# Patient Record
Sex: FEMALE | Race: WHITE | NOT HISPANIC OR LATINO | URBAN - METROPOLITAN AREA
[De-identification: names, ages, dates, MRNs, and addresses within clinical notes are randomized per-mention and may not be internally consistent; named-entity substitution may affect disease eponyms.]

---

## 2021-07-12 ENCOUNTER — OUTPATIENT (OUTPATIENT)
Dept: OUTPATIENT SERVICES | Facility: HOSPITAL | Age: 36
LOS: 1 days | Discharge: HOME | End: 2021-07-12
Payer: OTHER GOVERNMENT

## 2021-07-12 DIAGNOSIS — R10.9 UNSPECIFIED ABDOMINAL PAIN: ICD-10-CM

## 2021-07-12 PROCEDURE — 76775 US EXAM ABDO BACK WALL LIM: CPT | Mod: 26

## 2021-07-21 ENCOUNTER — INPATIENT (INPATIENT)
Facility: HOSPITAL | Age: 36
LOS: 1 days | Discharge: HOME | End: 2021-07-23
Attending: STUDENT IN AN ORGANIZED HEALTH CARE EDUCATION/TRAINING PROGRAM | Admitting: STUDENT IN AN ORGANIZED HEALTH CARE EDUCATION/TRAINING PROGRAM
Payer: OTHER GOVERNMENT

## 2021-07-21 VITALS
HEART RATE: 91 BPM | DIASTOLIC BLOOD PRESSURE: 83 MMHG | SYSTOLIC BLOOD PRESSURE: 195 MMHG | WEIGHT: 156.97 LBS | OXYGEN SATURATION: 99 % | RESPIRATION RATE: 18 BRPM | TEMPERATURE: 98 F

## 2021-07-21 LAB
ALBUMIN SERPL ELPH-MCNC: 4.1 G/DL — SIGNIFICANT CHANGE UP (ref 3.5–5.2)
ALP SERPL-CCNC: 72 U/L — SIGNIFICANT CHANGE UP (ref 30–115)
ALT FLD-CCNC: 11 U/L — SIGNIFICANT CHANGE UP (ref 0–41)
ANION GAP SERPL CALC-SCNC: 14 MMOL/L — SIGNIFICANT CHANGE UP (ref 7–14)
APTT BLD: 30.9 SEC — SIGNIFICANT CHANGE UP (ref 27–39.2)
AST SERPL-CCNC: 12 U/L — SIGNIFICANT CHANGE UP (ref 0–41)
BASOPHILS # BLD AUTO: 0.08 K/UL — SIGNIFICANT CHANGE UP (ref 0–0.2)
BASOPHILS NFR BLD AUTO: 0.6 % — SIGNIFICANT CHANGE UP (ref 0–1)
BILIRUB SERPL-MCNC: <0.2 MG/DL — SIGNIFICANT CHANGE UP (ref 0.2–1.2)
BUN SERPL-MCNC: 17 MG/DL — SIGNIFICANT CHANGE UP (ref 10–20)
CALCIUM SERPL-MCNC: 9 MG/DL — SIGNIFICANT CHANGE UP (ref 8.5–10.1)
CHLORIDE SERPL-SCNC: 103 MMOL/L — SIGNIFICANT CHANGE UP (ref 98–110)
CO2 SERPL-SCNC: 20 MMOL/L — SIGNIFICANT CHANGE UP (ref 17–32)
CREAT SERPL-MCNC: 0.7 MG/DL — SIGNIFICANT CHANGE UP (ref 0.7–1.5)
EOSINOPHIL # BLD AUTO: 0.07 K/UL — SIGNIFICANT CHANGE UP (ref 0–0.7)
EOSINOPHIL NFR BLD AUTO: 0.5 % — SIGNIFICANT CHANGE UP (ref 0–8)
GLUCOSE SERPL-MCNC: 107 MG/DL — HIGH (ref 70–99)
HCG SERPL QL: NEGATIVE — SIGNIFICANT CHANGE UP
HCT VFR BLD CALC: 42.5 % — SIGNIFICANT CHANGE UP (ref 37–47)
HGB BLD-MCNC: 14.1 G/DL — SIGNIFICANT CHANGE UP (ref 12–16)
IMM GRANULOCYTES NFR BLD AUTO: 0.5 % — HIGH (ref 0.1–0.3)
INR BLD: 1.01 RATIO — SIGNIFICANT CHANGE UP (ref 0.65–1.3)
LYMPHOCYTES # BLD AUTO: 2.8 K/UL — SIGNIFICANT CHANGE UP (ref 1.2–3.4)
LYMPHOCYTES # BLD AUTO: 20 % — LOW (ref 20.5–51.1)
MCHC RBC-ENTMCNC: 28.6 PG — SIGNIFICANT CHANGE UP (ref 27–31)
MCHC RBC-ENTMCNC: 33.2 G/DL — SIGNIFICANT CHANGE UP (ref 32–37)
MCV RBC AUTO: 86.2 FL — SIGNIFICANT CHANGE UP (ref 81–99)
MONOCYTES # BLD AUTO: 0.54 K/UL — SIGNIFICANT CHANGE UP (ref 0.1–0.6)
MONOCYTES NFR BLD AUTO: 3.9 % — SIGNIFICANT CHANGE UP (ref 1.7–9.3)
NEUTROPHILS # BLD AUTO: 10.44 K/UL — HIGH (ref 1.4–6.5)
NEUTROPHILS NFR BLD AUTO: 74.5 % — SIGNIFICANT CHANGE UP (ref 42.2–75.2)
NRBC # BLD: 0 /100 WBCS — SIGNIFICANT CHANGE UP (ref 0–0)
PLATELET # BLD AUTO: 419 K/UL — HIGH (ref 130–400)
POTASSIUM SERPL-MCNC: 3.7 MMOL/L — SIGNIFICANT CHANGE UP (ref 3.5–5)
POTASSIUM SERPL-SCNC: 3.7 MMOL/L — SIGNIFICANT CHANGE UP (ref 3.5–5)
PROT SERPL-MCNC: 6.8 G/DL — SIGNIFICANT CHANGE UP (ref 6–8)
PROTHROM AB SERPL-ACNC: 11.6 SEC — SIGNIFICANT CHANGE UP (ref 9.95–12.87)
RBC # BLD: 4.93 M/UL — SIGNIFICANT CHANGE UP (ref 4.2–5.4)
RBC # FLD: 13.1 % — SIGNIFICANT CHANGE UP (ref 11.5–14.5)
SARS-COV-2 RNA SPEC QL NAA+PROBE: SIGNIFICANT CHANGE UP
SODIUM SERPL-SCNC: 137 MMOL/L — SIGNIFICANT CHANGE UP (ref 135–146)
TROPONIN T SERPL-MCNC: <0.01 NG/ML — SIGNIFICANT CHANGE UP
WBC # BLD: 14 K/UL — HIGH (ref 4.8–10.8)
WBC # FLD AUTO: 14 K/UL — HIGH (ref 4.8–10.8)

## 2021-07-21 PROCEDURE — 93010 ELECTROCARDIOGRAM REPORT: CPT

## 2021-07-21 PROCEDURE — 70498 CT ANGIOGRAPHY NECK: CPT | Mod: 26,MA

## 2021-07-21 PROCEDURE — 70496 CT ANGIOGRAPHY HEAD: CPT | Mod: 26,MA

## 2021-07-21 PROCEDURE — 70551 MRI BRAIN STEM W/O DYE: CPT | Mod: 26,MA

## 2021-07-21 PROCEDURE — 70450 CT HEAD/BRAIN W/O DYE: CPT | Mod: 26,MA,59

## 2021-07-21 PROCEDURE — 71045 X-RAY EXAM CHEST 1 VIEW: CPT | Mod: 26

## 2021-07-21 PROCEDURE — 99218: CPT

## 2021-07-21 PROCEDURE — 99283 EMERGENCY DEPT VISIT LOW MDM: CPT

## 2021-07-21 PROCEDURE — 0042T: CPT

## 2021-07-21 RX ORDER — ASPIRIN/CALCIUM CARB/MAGNESIUM 324 MG
325 TABLET ORAL ONCE
Refills: 0 | Status: COMPLETED | OUTPATIENT
Start: 2021-07-21 | End: 2021-07-21

## 2021-07-21 RX ORDER — ATORVASTATIN CALCIUM 80 MG/1
80 TABLET, FILM COATED ORAL ONCE
Refills: 0 | Status: COMPLETED | OUTPATIENT
Start: 2021-07-21 | End: 2021-07-21

## 2021-07-21 RX ADMIN — ATORVASTATIN CALCIUM 80 MILLIGRAM(S): 80 TABLET, FILM COATED ORAL at 16:16

## 2021-07-21 RX ADMIN — Medication 325 MILLIGRAM(S): at 11:31

## 2021-07-21 NOTE — ED CDU PROVIDER INITIAL DAY NOTE - ATTENDING CONTRIBUTION TO CARE
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 She reports that she was sitting talking on the phone when she felt like the room was spinning, similar to prior episodes of BPPV. She went to sit on the cough, and then felt like there was a lump on the right side her throat with swallowing difficulty. On arrival NIH 1 d/t decreased sensation to R lower face. Not a tpa candidate due to low NIH score. HCT, CTA head/neck, and CTP wnl, no lvo or ischemia. Patient reports all symptoms have resolved, lasting 1 hour total.     Gen - NAD, Head - NCAT, Pharynx - clear, MMM, Heart - RRR, no m/g/r, Lungs - CTAB, no w/c/r, Abdomen - soft, NT, ND, Skin - No rash, Extremities - FROM, no edema, erythema, ecchymosis, brisk cap refill, Neuro - CN 2-12 intact, normal finger to nose, normal romberg, stable gait, no sensory or motor deficits, Alert, oriented, grossly unremarkable. No Focal deficits. GCS 15. NIH 0    Pt placed in obs for MRI brain

## 2021-07-21 NOTE — ED ADULT TRIAGE NOTE - CHIEF COMPLAINT QUOTE
Pt complaining of sudden double vision and R sided facial numbness. Pt complaining of trouble swallowing

## 2021-07-21 NOTE — ED CDU PROVIDER INITIAL DAY NOTE - MEDICAL DECISION MAKING DETAILS
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 She reports that she was sitting talking on the phone when she felt like the room was spinning, similar to prior episodes of BPPV. She went to sit on the cough, and then felt like there was a lump on the right side her throat with swallowing difficulty. On arrival NIH 1 d/t decreased sensation to R lower face. Not a tpa candidate due to low NIH score. HCT, CTA head/neck, and CTP wnl, no lvo or ischemia. Patient reports all symptoms have resolved, lasting 1 hour total. Pt placed in obs for MRI brain, full dose aspirin, high intensity statin, lipid panel, TTE.

## 2021-07-21 NOTE — ED CDU PROVIDER INITIAL DAY NOTE - OBJECTIVE STATEMENT
35  year old F with no pmhx c/o episode of dizziness (acute room spinning sensation) with assoc pins/needle sensation to tongue/mouth/ rt lower face, episode of double vision and difficulty swallowing. Symptoms lasted about 1 hr and have since resolved. No assoc chest pain, sob, headache, nausea, vomiting, weakness, loss of vision, lower extrem weakness, hx of cva. + pt smokes 1ppd.

## 2021-07-21 NOTE — ED PROVIDER NOTE - CLINICAL SUMMARY MEDICAL DECISION MAKING FREE TEXT BOX
35 year old female with a pmh of kidney stones presents here c/o blurry vision in her right eye numbness to right face and a "heaviness" when she swallows on the right sided which all began 30 minutes prior to arrival after eating pizza. Patient also endorsed dizziness which resolved. regine code was activated at triage. No fever chills cough cp sob n/v abdominal pain. LMP 7/4/21 VS reviewed labs imaging obtained and reviewed. Patient placed in obs for MRI.

## 2021-07-21 NOTE — ED CDU PROVIDER INITIAL DAY NOTE - NS ED ROS FT
Constitutional: no fever, chills, no recent weight loss, change in appetite or malaise  Eyes: + double vision  ENT: + difficulty swallowing. no rhinorrhea/ear pain/sore throat  Cardiac: No chest pain, SOB or edema.  Respiratory: No cough or respiratory distress  GI: No nausea, vomiting, diarrhea or abdominal pain.  : No dysuria, frequency, urgency or hematuria  MS: no pain to back or extremities, no loss of ROM, no weakness  Neuro: + dizziness, pins/needle sensation to mouth, rt lower face. No LOC.  Skin: No skin rash.  Endocrine: No history of thyroid disease or diabetes.  Except as documented in the HPI, all other systems are negative.

## 2021-07-21 NOTE — ED ADULT NURSE REASSESSMENT NOTE - NS ED NURSE REASSESS COMMENT FT1
Assumed care from previous  nurse Greer SUAREZ  c/o right facial numbness , s/p code stroke , awaiting for MRI result , on OBSERVATION status . Pt AO x 4 , denies facial numbness , denies double vision , denies visual changes , denies trouble swallowing , no facial droop, speech is clear , negative of any visual changes this time , denies double vision , denies dizziness , denies chest pain , ambulates with steady gait , aleena rm drift , no leg drift , sensation intact , denies vertigo , IVL intact , denies any weakness , denies any tingling and numbness this time , no labored breathing noted , frequent monitoring done

## 2021-07-21 NOTE — ED CDU PROVIDER INITIAL DAY NOTE - PHYSICAL EXAMINATION
CONSTITUTIONAL: Well-appearing; well-nourished; in no apparent distress.   EYES: PERRL; EOM intact.   ENT: normal nose; no rhinorrhea; normal pharynx with no tonsillar hypertrophy.   NECK: Supple; non-tender; no cervical lymphadenopathy.   CARDIOVASCULAR: Normal S1, S2; no murmurs, rubs, or gallops.   RESPIRATORY: Normal chest excursion with respiration; breath sounds clear and equal bilaterally; no wheezes, rhonchi, or rales.  GI/: Normal bowel sounds; non-distended; non-tender; no palpable organomegaly.   MS: No evidence of trauma or deformity.  Normal ROM in all four extremities; non-tender to palpation; distal pulses are normal.   SKIN: Normal for age and race; warm; dry; good turgor; no apparent lesions or exudate.   NEURO/PSYCH: A & O x 4; grossly unremarkable. mood and manner are appropriate. No focal deficits. No facial droop. No tongue deviation. Cerebellar intact. Normal gait. Sensation intact.

## 2021-07-21 NOTE — ED PROVIDER NOTE - PROGRESS NOTE DETAILS
SR; spoke with ULISES baker from neurology, believes it be bells due to recent UTI as patient blinks faster on the left. SR: spoke with Neuro NP olivia, can go to OBS for MRI

## 2021-07-21 NOTE — ED CDU PROVIDER INITIAL DAY NOTE - NSCAREINITIATED _GEN_ER
Naga Grant)
Pt states he lives in [facility] independent [facility] facility. No steps to negotiation, +elevator access. Pt states prior to admission being independent with all functional mobility and ADLs. Pt states he was ambulatory with a RW within the facility and used a straight cane for community mobility.
Pt states he lives in [facility] facility. No steps to negotiation, +elevator access. Pt states prior to admission being independent with all functional mobility and ADLs. Pt states he was ambulatory with a Rollator within the facility and used a quad cane for community mobility.

## 2021-07-21 NOTE — ED CDU PROVIDER INITIAL DAY NOTE - PROGRESS NOTE DETAILS
received so from EDU Barrera, evaluated patient at bedside. facial numbness lasted for about an hour. denies any symptoms now in EDOU. NIHSS - 0.

## 2021-07-21 NOTE — ED PROVIDER NOTE - NS ED ROS FT
Constitutional:  See HPI.  Eyes:  No visual changes, eye pain or discharge.  ENMT:  No hearing changes, pain, discharge or infections. No neck pain or stiffness.  Cardiac:  No chest pain, SOB or edema. No chest pain with exertion.  Respiratory:  No cough or respiratory distress. No hemoptysis. No history of asthma or RAD.  GI:  No nausea, vomiting, diarrhea or abdominal pain.  :  No dysuria, frequency or burning.  MS:  No myalgia, muscle weakness, joint pain or back pain.  Neuro:  See HPI.  Skin:  No skin rash.   Except as documented in the HPI,  all other systems are negative.

## 2021-07-21 NOTE — ED PROVIDER NOTE - PHYSICAL EXAMINATION
CONSTITUTIONAL: Well-appearing; well-nourished; in no apparent distress.   HEAD: Normocephalic; atraumatic.   EYES: PERRL; EOM intact. Conjunctiva normal B/L.   ENT: Normal pharynx with no tonsillar hypertrophy. MMM.  NECK: Supple; non-tender; no cervical lymphadenopathy.   CHEST: Normal chest excursion with respiration.   CARDIOVASCULAR: Normal S1, S2; no murmurs, rubs, or gallops.   RESPIRATORY: Normal chest excursion with respiration; breath sounds clear and equal bilaterally; no wheezes, rhonchi, or rales.  GI/: Normal bowel sounds; non-distended; non-tender.  BACK: No evidence of trauma or deformity. Non-tender to palpation. No CVA tenderness.   EXT: Normal ROM in all four extremities; non-tender to palpation; distal pulses are normal. Warm, no leg edema B/L.   SKIN: Normal for age and race; warm; dry; good turgor.  NEURO: A & O x 3; CN 2-12 intact. No dysarthria. No pronator drift. All extremities with 5/5 motor function. Gross sensation intact in all dermatomes.

## 2021-07-21 NOTE — CONSULT NOTE ADULT - SUBJECTIVE AND OBJECTIVE BOX
Neurology Consult    Patient is a 35y old  Female who presents with a chief complaint of right facial numbness    HPI:  Patient is a 34 y/o female w/ pmhx of recent UTI, kidney stones, who presents to the ED complaining of sudden onset right facial numbness, pressure in right eye/blurry vision, and difficulty swallowing. Symptoms began 30 mins prior to ED presentation. Stroke code called upon ED arrival. Also reported acute transient vertigo.     PAST MEDICAL & SURGICAL HISTORY:      FAMILY HISTORY:      Social History: (-) x 3    Allergies    No Known Allergies    Intolerances        MEDICATIONS  (STANDING):  aspirin 325 milliGRAM(s) Oral once    MEDICATIONS  (PRN):    Vital Signs Last 24 Hrs  T(C): 36.1 (21 Jul 2021 10:40), Max: 36.9 (21 Jul 2021 09:16)  T(F): 97 (21 Jul 2021 10:40), Max: 98.5 (21 Jul 2021 09:16)  HR: 82 (21 Jul 2021 10:40) (82 - 91)  BP: 134/78 (21 Jul 2021 10:40) (134/78 - 195/83)  BP(mean): --  RR: 16 (21 Jul 2021 10:40) (16 - 18)  SpO2: 100% (21 Jul 2021 10:40) (99% - 100%)    Examination:    NIH Stroke Scale:   · NIH Stroke Scale: LOC	(0) Alert; keenly responsive  · NIH Stroke Scale: LOC Question	(0) Answers both questions correctly  · NIH Stroke Scale: LOC Command	(0) Performs both tasks correctly  · NIH Stroke Scale: Gaze	(0) Normal  · NIH Stroke Scale: Visual	(0) No visual loss  · NIH Stroke Scale: Facial	(0) Normal symmetrical movements  · NIH Stroke Scale: Arm Left	(0) No drift; limb holds 90 (or 45) degrees for full 10 secs  · NIH Stroke Scale: Arm Right	(0) No drift; limb holds 90 (or 45) degrees for full 10 secs  · NIH Stroke Scale: Leg Left	(0) No drift; leg holds 30 degree position for full 5 secs  · NIH Stroke Scale: Leg Right	(0) No drift; leg holds 30 degree position for full 5 secs  · NIH Stroke Scale: Ataxia	(0) Absent  · NIH Stroke Scale: Sensory	(1) Mild-to-moderate sensory loss; patient feels pinprick is less sharp or is dull on the affected side; or there is a loss of superficial pain with pinprick, but patient is aware of being touched  · NIH Stroke Scale: Language	(0) No aphasia; normal  · NIH Stroke Scale: Dysarthria	(0) Normal  · NIH Stroke Scale: Extinct Inattention	(0) No abnormality  · NIH Stroke Scale: Total	1     Pre-stroke mRS Score:  mRS Score	(0) No symptoms      Labs:   CBC Full  -  ( 21 Jul 2021 09:30 )  WBC Count : 14.00 K/uL  RBC Count : 4.93 M/uL  Hemoglobin : 14.1 g/dL  Hematocrit : 42.5 %  Platelet Count - Automated : 419 K/uL  Mean Cell Volume : 86.2 fL  Mean Cell Hemoglobin : 28.6 pg  Mean Cell Hemoglobin Concentration : 33.2 g/dL  Auto Neutrophil # : 10.44 K/uL  Auto Lymphocyte # : 2.80 K/uL  Auto Monocyte # : 0.54 K/uL  Auto Eosinophil # : 0.07 K/uL  Auto Basophil # : 0.08 K/uL  Auto Neutrophil % : 74.5 %  Auto Lymphocyte % : 20.0 %  Auto Monocyte % : 3.9 %  Auto Eosinophil % : 0.5 %  Auto Basophil % : 0.6 %    07-21    137  |  103  |  17  ----------------------------<  107<H>  3.7   |  20  |  0.7    Ca    9.0      21 Jul 2021 09:30    TPro  6.8  /  Alb  4.1  /  TBili  <0.2  /  DBili  x   /  AST  12  /  ALT  11  /  AlkPhos  72  07-21    LIVER FUNCTIONS - ( 21 Jul 2021 09:30 )  Alb: 4.1 g/dL / Pro: 6.8 g/dL / ALK PHOS: 72 U/L / ALT: 11 U/L / AST: 12 U/L / GGT: x           PT/INR - ( 21 Jul 2021 09:30 )   PT: 11.60 sec;   INR: 1.01 ratio        < from: CT Angio Neck w/ IV Cont (07.21.21 @ 10:11) >  IMPRESSION:    CT PERFUSION:  No evidence of perfusion abnormality.    CTA HEAD:  No evidence of flow-limiting stenosis, occlusion or aneurysm.    CTA NECK:  No evidence of carotid or vertebral artery stenosis.      < end of copied text >  < from: CT Brain Stroke Protocol (07.21.21 @ 09:33) >  IMPRESSION:  No CT evidence of large acute territorial infarct.    No evidence of acute intracranial pathology. No mass effect, midline shift or intracranial hemorrhage.    These findings were discussed with ULISES Nielson of stroke neurology at 7/21/2021 9:36 AM by Dr. Clarke with read back confirmation.    < end of copied text >   Neurology Consult    Patient is a 35y old  Female who presents with a chief complaint of right facial numbness    HPI:  Patient is a 36 y/o female w/ pmhx of recent UTI, kidney stones, current tobacco use, who presents to the ED complaining of sudden onset right facial numbness, pressure in right eye/blurry vision, and difficulty swallowing. Symptoms began 30 mins prior to ED presentation. Stroke code called upon ED arrival. Also reported acute transient vertigo.     PAST MEDICAL & SURGICAL HISTORY:      FAMILY HISTORY:      Social History: (-) x 3    Allergies    No Known Allergies    Intolerances        MEDICATIONS  (STANDING):  aspirin 325 milliGRAM(s) Oral once    MEDICATIONS  (PRN):    Vital Signs Last 24 Hrs  T(C): 36.1 (21 Jul 2021 10:40), Max: 36.9 (21 Jul 2021 09:16)  T(F): 97 (21 Jul 2021 10:40), Max: 98.5 (21 Jul 2021 09:16)  HR: 82 (21 Jul 2021 10:40) (82 - 91)  BP: 134/78 (21 Jul 2021 10:40) (134/78 - 195/83)  BP(mean): --  RR: 16 (21 Jul 2021 10:40) (16 - 18)  SpO2: 100% (21 Jul 2021 10:40) (99% - 100%)    Examination:    NIH Stroke Scale:   · NIH Stroke Scale: LOC	(0) Alert; keenly responsive  · NIH Stroke Scale: LOC Question	(0) Answers both questions correctly  · NIH Stroke Scale: LOC Command	(0) Performs both tasks correctly  · NIH Stroke Scale: Gaze	(0) Normal  · NIH Stroke Scale: Visual	(0) No visual loss  · NIH Stroke Scale: Facial	(0) Normal symmetrical movements  · NIH Stroke Scale: Arm Left	(0) No drift; limb holds 90 (or 45) degrees for full 10 secs  · NIH Stroke Scale: Arm Right	(0) No drift; limb holds 90 (or 45) degrees for full 10 secs  · NIH Stroke Scale: Leg Left	(0) No drift; leg holds 30 degree position for full 5 secs  · NIH Stroke Scale: Leg Right	(0) No drift; leg holds 30 degree position for full 5 secs  · NIH Stroke Scale: Ataxia	(0) Absent  · NIH Stroke Scale: Sensory	(1) Mild-to-moderate sensory loss; patient feels pinprick is less sharp or is dull on the affected side; or there is a loss of superficial pain with pinprick, but patient is aware of being touched  · NIH Stroke Scale: Language	(0) No aphasia; normal  · NIH Stroke Scale: Dysarthria	(0) Normal  · NIH Stroke Scale: Extinct Inattention	(0) No abnormality  · NIH Stroke Scale: Total	1     Pre-stroke mRS Score:  mRS Score	(0) No symptoms      Labs:   CBC Full  -  ( 21 Jul 2021 09:30 )  WBC Count : 14.00 K/uL  RBC Count : 4.93 M/uL  Hemoglobin : 14.1 g/dL  Hematocrit : 42.5 %  Platelet Count - Automated : 419 K/uL  Mean Cell Volume : 86.2 fL  Mean Cell Hemoglobin : 28.6 pg  Mean Cell Hemoglobin Concentration : 33.2 g/dL  Auto Neutrophil # : 10.44 K/uL  Auto Lymphocyte # : 2.80 K/uL  Auto Monocyte # : 0.54 K/uL  Auto Eosinophil # : 0.07 K/uL  Auto Basophil # : 0.08 K/uL  Auto Neutrophil % : 74.5 %  Auto Lymphocyte % : 20.0 %  Auto Monocyte % : 3.9 %  Auto Eosinophil % : 0.5 %  Auto Basophil % : 0.6 %    07-21    137  |  103  |  17  ----------------------------<  107<H>  3.7   |  20  |  0.7    Ca    9.0      21 Jul 2021 09:30    TPro  6.8  /  Alb  4.1  /  TBili  <0.2  /  DBili  x   /  AST  12  /  ALT  11  /  AlkPhos  72  07-21    LIVER FUNCTIONS - ( 21 Jul 2021 09:30 )  Alb: 4.1 g/dL / Pro: 6.8 g/dL / ALK PHOS: 72 U/L / ALT: 11 U/L / AST: 12 U/L / GGT: x           PT/INR - ( 21 Jul 2021 09:30 )   PT: 11.60 sec;   INR: 1.01 ratio        < from: CT Angio Neck w/ IV Cont (07.21.21 @ 10:11) >  IMPRESSION:    CT PERFUSION:  No evidence of perfusion abnormality.    CTA HEAD:  No evidence of flow-limiting stenosis, occlusion or aneurysm.    CTA NECK:  No evidence of carotid or vertebral artery stenosis.      < end of copied text >  < from: CT Brain Stroke Protocol (07.21.21 @ 09:33) >  IMPRESSION:  No CT evidence of large acute territorial infarct.    No evidence of acute intracranial pathology. No mass effect, midline shift or intracranial hemorrhage.    These findings were discussed with ULISES Nielson of stroke neurology at 7/21/2021 9:36 AM by Dr. Clarke with read back confirmation.    < end of copied text >

## 2021-07-21 NOTE — CONSULT NOTE ADULT - ATTENDING COMMENTS
I have personally seen and examined the patient. I have reviewed all pertinent history, labs, vitals, meds, and imaging.     Pt is a 36 yo F with PMHx of tobacco use, who presents with transient right facial paresthesia, blurred vision and vertigo. LKW 0830. NIHSS 1 on initial exam for sensory disturbance, then 0 when re-examined. pt denies headache, vertigo resolved. cranial nerves intact. Subtle right pronator drift, however strength 5/5 in all muscle groups in BUE and BLE. Sensation intact to light touch in BUE and BLE. FTN and HTS intact b/l. Gait with normal stance and stride, able to ambulate independently without difficulty. CT head without acute process. Pt was not an IV thrombolytic candidate due to mild, nondisabling symptoms that were rapidly improving. No ELVO for ET consideration. CTA head/neck with dominant right VA, left VA ends in PICA, functional b/l fetal PCA with diminutive distal basilar artery. No flow limiting stenosis appreciated in anterior or posterior circulation. Agree with admission to obs, MRI brain, ASA/statin for now. Uncertain etiology of episode, TIA vs complex migraine vs hypertensive urgency (/83). Suggest SBP<180 mmHg x 24 hrs then slowly reduce to <140 mm Hg. I have personally seen and examined the patient. I have reviewed all pertinent history, labs, vitals, meds, and imaging.     Pt is a 36 yo F with PMHx of tobacco use, who presents with transient right facial paresthesia, blurred vision and vertigo. LKW 0830. NIHSS 1 on initial exam for sensory disturbance, then 0 when re-examined. pt denies headache, vertigo resolved. cranial nerves intact. Subtle right pronator drift, however strength 5/5 in all muscle groups in BUE and BLE. Sensation intact to light touch in BUE and BLE. FTN and HTS intact b/l. Gait with normal stance and stride, able to ambulate independently without difficulty. CT head without acute process. Pt was not an IV thrombolytic candidate due to mild, nondisabling symptoms that were rapidly improving. No ELVO for EVT consideration. CTA head/neck with dominant right VA, left VA ends in PICA, functional b/l fetal PCA with diminutive distal basilar artery. No flow limiting stenosis appreciated in anterior or posterior circulation. Agree with admission to obs, MRI brain, ASA/statin for now. Uncertain etiology of episode, TIA vs complex migraine vs hypertensive urgency (/83). Suggest SBP<180 mmHg x 24 hrs then slowly reduce to <140 mm Hg.

## 2021-07-21 NOTE — CONSULT NOTE ADULT - ASSESSMENT
Impression:  Patient is a 36 y/o female w/ pmhx of recent UTI, kidney stones, who presents to the ED complaining of sudden onset right facial numbness, pressure in right eye/blurry vision, and difficulty swallowing. Symptoms began 30 mins prior to ED presentation. Stroke code called upon ED arrival. Also reported acute transient vertigo. Patient with mild improving non debilitating symptoms patient agreed risk of IV thrombolyics greater than benefit. No LVO on CTA not a candidate for IA intervention.    Suggestion:  MRI brain without mart.   Can go to ED observational unit Impression:  Patient is a 34 y/o female w/ pmhx of recent UTI, kidney stones, current tobacco use, who presents to the ED complaining of sudden onset right facial numbness, pressure in right eye/blurry vision, and difficulty swallowing. Symptoms began 30 mins prior to ED presentation. Stroke code called upon ED arrival. Also reported acute transient vertigo. Patient with mild improving non debilitating symptoms patient agreed risk of IV thrombolytics greater than benefit. No LVO on CTA not a candidate for IA intervention.    Suggestion:  MRI brain without mart.   Can go to ED observational unit

## 2021-07-21 NOTE — ED ADULT NURSE REASSESSMENT NOTE - NS ED NURSE REASSESS COMMENT FT1
Pt reassessed A/O times 4 Vs stable o cardiac ,monitor denies chest pain ,no SOB ,speech clear ambulate steady  able to swallow well , o fever or chills ,dinner provide did eat 75% assistance provide ,pt is seen evaluate by Ed attending waiting for MRI ,on going nursing observation .

## 2021-07-21 NOTE — ED PROVIDER NOTE - ATTENDING CONTRIBUTION TO CARE
35 year old female with a pmh of kidney stones presents here c/o blurry vision in her right eye numbness to right face and a "heaviness" when she swallows on the right sided which all began 30 minutes prior to arrival after eating pizza. Patient also endorsed dizziness which resolved. regine code was activated at triage. No fever chills cough cp sob n/v abdominal pain. LMP 7/4/21  On exam  CONSTITUTIONAL: WA / WN / NAD  HEAD: NCAT  EYES: PERRL; EOMI;   ENT: Normal pharynx; mucous membranes pink/moist, no erythema.  NECK: Supple; no meningeal signs  CARD: RRR; nl S1/S2; no M/R/G.   RESP: Respiratory rate and effort are normal; breath sounds clear and equal bilaterally.  ABD: Soft, NT ND  MSK/EXT: No gross deformities; full range of motion.  SKIN: Warm and dry;   NEURO: AAOx3, Motor 5/5 x 4 extremities, Sensations intact b/l. No facial droop. Normal speech no dysarthria or dysmetria.   PSYCH: Memory Intact, Normal Affect

## 2021-07-21 NOTE — STROKE CODE NOTE - IV ALTEPLASE EXCLUSION ABS OTHER
NIHSS 1 discussed with patient for mild non debilitating symptoms she agreees risk outweighs benefit of IV thrombolytics. NIHSS 1 not a candidate for IA intervention. NIHSS 1 discussed with patient for mild non debilitating symptoms she agrees risk outweighs benefit of IV thrombolytics. NIHSS 1 not a candidate for IA intervention.

## 2021-07-22 ENCOUNTER — TRANSCRIPTION ENCOUNTER (OUTPATIENT)
Age: 36
End: 2021-07-22

## 2021-07-22 PROCEDURE — 99217: CPT

## 2021-07-22 PROCEDURE — 99222 1ST HOSP IP/OBS MODERATE 55: CPT

## 2021-07-22 PROCEDURE — 99254 IP/OBS CNSLTJ NEW/EST MOD 60: CPT

## 2021-07-22 RX ORDER — ATORVASTATIN CALCIUM 80 MG/1
80 TABLET, FILM COATED ORAL AT BEDTIME
Refills: 0 | Status: DISCONTINUED | OUTPATIENT
Start: 2021-07-22 | End: 2021-07-23

## 2021-07-22 RX ORDER — CLOPIDOGREL BISULFATE 75 MG/1
300 TABLET, FILM COATED ORAL ONCE
Refills: 0 | Status: COMPLETED | OUTPATIENT
Start: 2021-07-22 | End: 2021-07-22

## 2021-07-22 RX ORDER — CLOPIDOGREL BISULFATE 75 MG/1
75 TABLET, FILM COATED ORAL DAILY
Refills: 0 | Status: DISCONTINUED | OUTPATIENT
Start: 2021-07-23 | End: 2021-07-23

## 2021-07-22 RX ORDER — ASPIRIN/CALCIUM CARB/MAGNESIUM 324 MG
81 TABLET ORAL DAILY
Refills: 0 | Status: DISCONTINUED | OUTPATIENT
Start: 2021-07-22 | End: 2021-07-23

## 2021-07-22 RX ORDER — ACETAMINOPHEN 500 MG
650 TABLET ORAL ONCE
Refills: 0 | Status: COMPLETED | OUTPATIENT
Start: 2021-07-22 | End: 2021-07-22

## 2021-07-22 RX ORDER — NICOTINE POLACRILEX 2 MG
1 GUM BUCCAL DAILY
Refills: 0 | Status: DISCONTINUED | OUTPATIENT
Start: 2021-07-22 | End: 2021-07-23

## 2021-07-22 RX ADMIN — Medication 650 MILLIGRAM(S): at 11:59

## 2021-07-22 RX ADMIN — CLOPIDOGREL BISULFATE 300 MILLIGRAM(S): 75 TABLET, FILM COATED ORAL at 11:13

## 2021-07-22 RX ADMIN — Medication 1 PATCH: at 22:19

## 2021-07-22 RX ADMIN — Medication 650 MILLIGRAM(S): at 10:25

## 2021-07-22 RX ADMIN — ATORVASTATIN CALCIUM 80 MILLIGRAM(S): 80 TABLET, FILM COATED ORAL at 21:13

## 2021-07-22 RX ADMIN — Medication 81 MILLIGRAM(S): at 12:28

## 2021-07-22 NOTE — OCCUPATIONAL THERAPY INITIAL EVALUATION ADULT - GENERAL OBSERVATIONS, REHAB EVAL
Pt encountered seated on side of stretcher in ER in NAD, + IV locked, + family present at bedside. Pt agreeable to bedside OT assessment. May be seen as confirmed with RN. Pt returned as found, RN aware

## 2021-07-22 NOTE — OCCUPATIONAL THERAPY INITIAL EVALUATION ADULT - REHAB POTENTIAL, OT EVAL
Pt presently at functional baseline No skilled OT services warranted at this time, reconsult OT should functional status change/none

## 2021-07-22 NOTE — CONSULT NOTE ADULT - ASSESSMENT
34 y/o F with no significant PMH has presented to ED c/o dizziness, numbness of face and difficulty swallowing 30 min prior to arrival to ED.  She was kept under observation in ED overnight for further workup Her MRI of brain revealed acute infarct of right inferior cerebellar hemisphere. She is being admitted in stroke unit for further evaluation and management.    Impression   -Acute infarct of right inferior cerebellar hemisphere      Recommendations   -telemetry monitor   -cont with aspirin and Lipitor    34 y/o F with no significant PMH has presented to ED c/o dizziness, numbness of face and difficulty swallowing 30 min prior to arrival to ED.  She was kept under observation in ED overnight for further workup Her MRI of brain revealed acute infarct of right inferior cerebellar hemisphere. She is being admitted in stroke unit for further evaluation and management.    Impression   -Acute infarct of right inferior cerebellar hemisphere      Recommendations   -telemetry monitor   -cont with aspirin and Lipitor  -scheduled for TTE tomorrow   -NPO after midnight     36 y/o F with no significant PMH has presented to ED c/o dizziness, numbness of face and difficulty swallowing 30 min prior to arrival to ED.  She was kept under observation in ED overnight for further workup Her MRI of brain revealed acute infarct of right inferior cerebellar hemisphere. She is being admitted in stroke unit for further evaluation and management.    Impression   -Acute infarct of right inferior cerebellar hemisphere      Recommendations   -telemetry monitor   -cont with aspirin and Lipitor  -2echo with  bubble study  -scheduled for EDA tomorrow   -NPO after midnight     34 y/o F with no significant PMH has presented to ED c/o dizziness, numbness of face and difficulty swallowing 30 min prior to arrival to ED.  She was kept under observation in ED overnight for further workup Her MRI of brain revealed acute infarct of right inferior cerebellar hemisphere. She is being admitted in stroke unit for further evaluation and management.    Impression:     -Acute infarct of right inferior cerebellar hemisphere      Recommendations   -telemetry monitor   -cont with aspirin and Lipitor  -2echo with  bubble study  -scheduled for EDA tomorrow   -NPO after midnight    - EP for [facility] as outpatient

## 2021-07-22 NOTE — ED CDU PROVIDER SUBSEQUENT DAY NOTE - MEDICAL DECISION MAKING DETAILS
34yo woman no significant PMH was placed in CDU for workup of TIA/CVA s/p 1 hour episode of facial tingling and difficulty swallowing PTA. ED workup was unremarkable and pt had NIHSS 0 in CDU; however MRI revealed multiple small cerebellar infarcts. Pt was reassessed by neuro team, will admit stroke unit for further workup. Pt understands dx and is amenable to plan. She continues to be asymptomatic.

## 2021-07-22 NOTE — H&P ADULT - ATTENDING COMMENTS
I have personally seen and examined the patient. I have reviewed all pertinent history, labs, vitals, meds, and imaging.     Pt is a 36 yo F with PMHx of tobacco use (1ppd), who presented with transient vertigo, blurred vision and right facial paresthesia. pt was deemed not an IV thrombolytic candidate due to mild, nondisabling symptoms on presentation. Pt continues to do well today, denies any recurrence of symptoms. Right-beating nystagmus with rightward gaze, right pronator drift, strength 5/5 in BUE and BLE, FTN and HTS without overt dysmetria, gait normal. MRI brain shows a small acute ischemic involving the right cerebellum, R PICA distribution. Pt to be admitted to stroke unit for further testing. Uncertain etiology of stroke. Only risk factor is tobacco use. No hormonal medications, h/o DVT/PE, clotting disorder or family h/o stroke at young age. Posterior circulation strokes in young patients are most commonly related to VA dissection, however do not appreciate any changes on CTA concerning for dissection. 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 Further workup ordered: EDA, hypercoag panel, autoimmune workup including anti-phospholipid antibodies. If positive, will need skin biopsy for confirmation (can be arranged outpatient). Continue DAPT for now. If  aPL-positive, would appreciate Heme/Onc consult, would consider switching from ATP to AC for secondary stroke prevention.

## 2021-07-22 NOTE — CONSULT NOTE ADULT - SUBJECTIVE AND OBJECTIVE BOX
HPI:  36 y/o F with no significant PMH has presented to ED c/o dizziness, numbness of face and difficulty swallowing 30 min prior to arrival to ED. She was sitting with her friends for breakfast, when she suddenly felt that the room is spinning. she immediately lie down then she started to experience numbness of her face and she was having trouble swallowing. She never had similar symptom in the past. She used to have dizziness few years ago, but at that time it was only when she changed position. She denies any headache, blurry vision, slurring of speech, weakness, tingling or numbness on any other part of body. She has recently been treated with antibiotics for UTI. No h/O stroke in the past. She is not taking any medication now , not on oral contraceptive.She denies any H/O miscarriage, no H/O DVT/PE. Denies any fever, cough, shortness of breath or any other constitutional symptoms. In ED , stroke call was called, her CTH  showed no acute patholgy, CTA no large vessel occlusion. Her symptoms almost resolved at that time, NIH:1 for sensory impairment. After discussion with patient, TPA was not given as her symptoms were mild and resolving. She was kept under observation in ED overnight for further workup Her MRI of brain revealed acute infarct of right inferior cerebellar hemisphere. She is being admitted in stroek unit for further evaluation and management.  (22 Jul 2021 10:52)  Cardiology consulted for evaluation of cardioembolic source and EDA.    PAST MEDICAL & SURGICAL HISTORY  No pertinent past medical history    No significant past surgical history        FAMILY HISTORY:  FAMILY HISTORY:  No pertinent family history in first degree relatives        SOCIAL HISTORY:  [x]smoker  []Alcohol  []Drug    ALLERGIES:  No Known Allergies      MEDICATIONS:  MEDICATIONS  (STANDING):  aspirin enteric coated 81 milliGRAM(s) Oral daily  atorvastatin 80 milliGRAM(s) Oral at bedtime  nicotine - 21 mG/24Hr(s) Patch 1 Patch Transdermal daily    MEDICATIONS  (PRN):      HOME MEDICATIONS:  Home Medications:      VITALS:   T(F): 97.4 (07-22 @ 07:17), Max: 98.7 (07-21 @ 17:03)  HR: 80 (07-22 @ 07:17) (70 - 91)  BP: 116/66 (07-22 @ 07:17) (115/63 - 195/83)  BP(mean): --  RR: 16 (07-22 @ 07:17) (15 - 18)  SpO2: 99% (07-22 @ 07:17) (98% - 100%)    I&O's Summary      REVIEW OF SYSTEMS:  CONSTITUTIONAL: Positive for dizziness   EYES: No visual changes  ENT: No vertigo or throat pain   NECK: No pain or stiffness  RESPIRATORY: No cough, wheezing, hemoptysis; No shortness of breath  CARDIOVASCULAR: No chest pain or palpitations  GASTROINTESTINAL: No abdominal or epigastric pain. No nausea, vomiting, or hematemesis; No diarrhea or constipation. No melena or hematochezia.  GENITOURINARY: No dysuria, frequency or hematuria  NEUROLOGICAL: No numbness or weakness  SKIN: No itching, no rashes  MSK: No pain    PHYSICAL EXAM:  NEURO: patient is awake , alert and oriented  GEN: Not in acute distress  NECK: no thyroid enlargement, no JVD  LUNGS: Clear to auscultation bilaterally   CARDIOVASCULAR: S1/S2 present, RRR , no murmurs or rubs, no carotid bruits,  + PP bilaterally  ABD: Soft, non-tender, non-distended, +BS  EXT: No HATTIE  SKIN: Intact    LABS:                        14.1   14.00 )-----------( 419      ( 21 Jul 2021 09:30 )             42.5     07-21    137  |  103  |  17  ----------------------------<  107<H>  3.7   |  20  |  0.7    Ca    9.0      21 Jul 2021 09:30    TPro  6.8  /  Alb  4.1  /  TBili  <0.2  /  DBili  x   /  AST  12  /  ALT  11  /  AlkPhos  72  07-21    PT/INR - ( 21 Jul 2021 09:30 )   PT: 11.60 sec;   INR: 1.01 ratio         PTT - ( 21 Jul 2021 09:30 )  PTT:30.9 sec    CARDIAC MARKERS ( 21 Jul 2021 09:30 )  x     / <0.01 ng/mL / x     / x     / x            RADIOLOGY:  -CXR:  < from: Xray Chest 1 View-PORTABLE IMMEDIATE (Xray Chest 1 View-PORTABLE IMMEDIATE .) (07.21.21 @ 10:45) >  Impression:    No radiographic evidence of acute cardiopulmonary disease.      < end of copied text >      ECG:    < from: 12 Lead ECG (07.21.21 @ 09:50) >  Diagnosis Line Normal sinus rhythm  Normal ECG    < end of copied text >   HPI:  34 y/o F with no significant PMH has presented to ED c/o dizziness, numbness of face and difficulty swallowing 30 min prior to arrival to ED. She was sitting with her friends for breakfast, when she suddenly felt that the room is spinning. she immediately lie down then she started to experience numbness of her face and she was having trouble swallowing. She never had similar symptom in the past. She used to have dizziness few years ago, but at that time it was only when she changed position. She denies any headache, blurry vision, slurring of speech, weakness, tingling or numbness on any other part of body. She has recently been treated with antibiotics for UTI. No h/O stroke in the past. She is not taking any medication now , not on oral contraceptive.She denies any H/O miscarriage, no H/O DVT/PE. Denies any fever, cough, shortness of breath or any other constitutional symptoms. In ED , stroke call was called, her CTH  showed no acute patholgy, CTA no large vessel occlusion. Her symptoms almost resolved at that time, NIH:1 for sensory impairment. After discussion with patient, TPA was not given as her symptoms were mild and resolving. She was kept under observation in ED overnight for further workup Her MRI of brain revealed acute infarct of right inferior cerebellar hemisphere. She is being admitted in stroek unit for further evaluation and management.  (22 Jul 2021 10:52)  Cardiology consulted for evaluation of cardioembolic source and EDA.    PAST MEDICAL & SURGICAL HISTORY  No pertinent past medical history    No significant past surgical history        FAMILY HISTORY:  FAMILY HISTORY:  No pertinent family history in first degree relatives        SOCIAL HISTORY:  [x]smoker  []Alcohol  []Drug    ALLERGIES:  No Known Allergies      MEDICATIONS:  MEDICATIONS  (STANDING):  aspirin enteric coated 81 milliGRAM(s) Oral daily  atorvastatin 80 milliGRAM(s) Oral at bedtime  nicotine - 21 mG/24Hr(s) Patch 1 Patch Transdermal daily    MEDICATIONS  (PRN):      HOME MEDICATIONS:  Home Medications:      VITALS:   T(F): 97.4 (07-22 @ 07:17), Max: 98.7 (07-21 @ 17:03)  HR: 80 (07-22 @ 07:17) (70 - 91)  BP: 116/66 (07-22 @ 07:17) (115/63 - 195/83)  BP(mean): --  RR: 16 (07-22 @ 07:17) (15 - 18)  SpO2: 99% (07-22 @ 07:17) (98% - 100%)    I&O's Summary      REVIEW OF SYSTEMS:  CONSTITUTIONAL: Positive for dizziness   EYES: No visual changes  ENT: No vertigo or throat pain   NECK: No pain or stiffness  RESPIRATORY: No cough, wheezing, hemoptysis; No shortness of breath  CARDIOVASCULAR: No chest pain or palpitations  GASTROINTESTINAL: No abdominal or epigastric pain. No nausea, vomiting, or hematemesis; No diarrhea or constipation. No melena or hematochezia.  GENITOURINARY: No dysuria, frequency or hematuria  NEUROLOGICAL: No numbness or weakness  SKIN: No itching, no rashes  MSK: No pain    PHYSICAL EXAM:  NEURO: patient is awake , alert and oriented  GEN: Not in acute distress  NECK: no thyroid enlargement, no JVD  LUNGS: Clear to auscultation bilaterally   CARDIOVASCULAR: S1/S2 present, RRR , no murmurs or rubs, no carotid bruits,  + PP bilaterally  ABD: Soft, non-tender, non-distended, +BS  EXT: No HATTIE  SKIN: Intact    LABS:                        14.1   14.00 )-----------( 419      ( 21 Jul 2021 09:30 )             42.5     07-21    137  |  103  |  17  ----------------------------<  107<H>  3.7   |  20  |  0.7    Ca    9.0      21 Jul 2021 09:30    TPro  6.8  /  Alb  4.1  /  TBili  <0.2  /  DBili  x   /  AST  12  /  ALT  11  /  AlkPhos  72  07-21    PT/INR - ( 21 Jul 2021 09:30 )   PT: 11.60 sec;   INR: 1.01 ratio         PTT - ( 21 Jul 2021 09:30 )  PTT:30.9 sec    CARDIAC MARKERS ( 21 Jul 2021 09:30 )  x     / <0.01 ng/mL / x     / x     / x            RADIOLOGY:  -CXR:  < from: Xray Chest 1 View-PORTABLE IMMEDIATE (Xray Chest 1 View-PORTABLE IMMEDIATE .) (07.21.21 @ 10:45) >  Impression:    No radiographic evidence of acute cardiopulmonary disease.      < end of copied text >      < from: MR Head No Cont (07.21.21 @ 22:55) >    IMPRESSION:  Focal acute infarcts involving the right inferior cerebellar hemisphere. No evidence of hemorrhage.    A single nonspecificfocus of signal abnormality is noted within the right frontal subcortical region. Finding may represent sequela of mild chronic microvascular change, demyelination or gliosis.      < end of copied text >      ECG:    < from: 12 Lead ECG (07.21.21 @ 09:50) >  Diagnosis Line Normal sinus rhythm  Normal ECG    < end of copied text >   HPI:    34 y/o F with no significant PMH has presented to ED c/o dizziness, numbness of face and difficulty swallowing 30 min prior to arrival to ED. She was sitting with her friends for breakfast, when she suddenly felt that the room is spinning. she immediately lie down then she started to experience numbness of her face and she was having trouble swallowing. She never had similar symptom in the past. She used to have dizziness few years ago, but at that time it was only when she changed position. She denies any headache, blurry vision, slurring of speech, weakness, tingling or numbness on any other part of body. She has recently been treated with antibiotics for UTI. No h/O stroke in the past. She is not taking any medication now , not on oral contraceptive. She denies any H/O miscarriage, no H/O DVT/PE. Denies any fever, cough, shortness of breath or any other constitutional symptoms. In ED , stroke call was called, her CTH  showed no acute pathology CTA no large vessel occlusion. Her symptoms almost resolved at that time, NIH:1 for sensory impairment. After discussion with patient, TPA was not given as her symptoms were mild and resolving. She was kept under observation in ED overnight for further workup Her MRI of brain revealed acute infarct of right inferior cerebellar hemisphere. She is being admitted in stroke unit for further evaluation and management.  (22 Jul 2021 10:52)  Cardiology consulted for evaluation of cardioembolic source and EDA.      PAST MEDICAL & SURGICAL HISTORY  No pertinent past medical history    No significant past surgical history        FAMILY HISTORY:  FAMILY HISTORY:  No pertinent family history in first degree relatives        SOCIAL HISTORY:  [x]smoker  []Alcohol  []Drug    ALLERGIES:  No Known Allergies      MEDICATIONS:  MEDICATIONS  (STANDING):  aspirin enteric coated 81 milliGRAM(s) Oral daily  atorvastatin 80 milliGRAM(s) Oral at bedtime  nicotine - 21 mG/24Hr(s) Patch 1 Patch Transdermal daily    MEDICATIONS  (PRN):      HOME MEDICATIONS:  Home Medications:      VITALS:   T(F): 97.4 (07-22 @ 07:17), Max: 98.7 (07-21 @ 17:03)  HR: 80 (07-22 @ 07:17) (70 - 91)  BP: 116/66 (07-22 @ 07:17) (115/63 - 195/83)  BP(mean): --  RR: 16 (07-22 @ 07:17) (15 - 18)  SpO2: 99% (07-22 @ 07:17) (98% - 100%)    I&O's Summary      REVIEW OF SYSTEMS:  CONSTITUTIONAL: Positive for dizziness   EYES: No visual changes  ENT: No vertigo or throat pain   NECK: No pain or stiffness  RESPIRATORY: No cough, wheezing, hemoptysis; No shortness of breath  CARDIOVASCULAR: No chest pain or palpitations  GASTROINTESTINAL: No abdominal or epigastric pain. No nausea, vomiting, or hematemesis; No diarrhea or constipation. No melena or hematochezia.  GENITOURINARY: No dysuria, frequency or hematuria  NEUROLOGICAL: No numbness or weakness  SKIN: No itching, no rashes  MSK: No pain    PHYSICAL EXAM:    NEURO: patient is awake , alert and oriented  GEN: Not in acute distress  NECK: no thyroid enlargement, no JVD  LUNGS: Clear to auscultation bilaterally   CARDIOVASCULAR: S1/S2 present, RRR , no murmurs or rubs, no carotid bruits,  + PP bilaterally  ABD: Soft, non-tender, non-distended, +BS  EXT: No HATTIE  SKIN: Intact    LABS:                        14.1   14.00 )-----------( 419      ( 21 Jul 2021 09:30 )             42.5     07-21    137  |  103  |  17  ----------------------------<  107<H>  3.7   |  20  |  0.7    Ca    9.0      21 Jul 2021 09:30    TPro  6.8  /  Alb  4.1  /  TBili  <0.2  /  DBili  x   /  AST  12  /  ALT  11  /  AlkPhos  72  07-21    PT/INR - ( 21 Jul 2021 09:30 )   PT: 11.60 sec;   INR: 1.01 ratio         PTT - ( 21 Jul 2021 09:30 )  PTT:30.9 sec    CARDIAC MARKERS ( 21 Jul 2021 09:30 )  x     / <0.01 ng/mL / x     / x     / x            RADIOLOGY:  -CXR:  < from: Xray Chest 1 View-PORTABLE IMMEDIATE (Xray Chest 1 View-PORTABLE IMMEDIATE .) (07.21.21 @ 10:45) >  Impression:    No radiographic evidence of acute cardiopulmonary disease.      < end of copied text >      < from: MR Head No Cont (07.21.21 @ 22:55) >    IMPRESSION:  Focal acute infarcts involving the right inferior cerebellar hemisphere. No evidence of hemorrhage.    A single nonspecificfocus of signal abnormality is noted within the right frontal subcortical region. Finding may represent sequela of mild chronic microvascular change, demyelination or gliosis.      < end of copied text >      ECG:    < from: 12 Lead ECG (07.21.21 @ 09:50) >  Diagnosis Line Normal sinus rhythm  Normal ECG    < end of copied text >

## 2021-07-22 NOTE — PHYSICAL THERAPY INITIAL EVALUATION ADULT - PERTINENT HX OF CURRENT PROBLEM, REHAB EVAL
Pt presents to [facility] c c/o dizziness, difficulty swallowing, facial numbness. All sxs appear to have self resolved. CT scan -, MRI + for cerebellar infarct.

## 2021-07-22 NOTE — ED CDU PROVIDER SUBSEQUENT DAY NOTE - ATTENDING CONTRIBUTION TO CARE
36yo woman no significant PMH was placed in CDU for workup of TIA/CVA s/p 1 hour episode of facial tingling and difficulty swallowing PTA. ED workup was unremarkable and pt had NIHSS 0 in CDU; however MRI revealed multiple small cerebellar infarcts. Pt was reassessed by neuro team, will admit stroke unit for further workup. Pt understands dx and is amenable to plan. She continues to be asymptomatic.

## 2021-07-22 NOTE — CHART NOTE - NSCHARTNOTEFT_GEN_A_CORE
Brief Stroke Note:    Reviewed MRI brain, shows a small acute ischemic infarct in the right inferior cerebellum. Plan to admit patient to Stroke Unit for EDA (may d/c TTE), hypercoag panel and autoimmune testing. Discussed with patient who is amenable to admission and further testing.

## 2021-07-22 NOTE — CONSULT NOTE ADULT - SUBJECTIVE AND OBJECTIVE BOX
HPI:  36 y/o F with PMH of current tobacco use (1ppd), who presented to ED c/o dizziness, numbness of face and difficulty swallowing 30 min prior to arrival to ED. She was sitting with her friends for breakfast, when she suddenly felt that the room is spinning. she immediately lie down then she started to experience numbness of her face and she was having trouble swallowing. She never had similar symptom in the past. She used to have dizziness few years ago, but at that time it was only when she changed position. She denies any headache, blurry vision, slurring of speech, weakness, tingling or numbness on any other part of body. She has recently been treated with antibiotics for UTI. No h/O stroke in the past. She is not taking any medication now , not on oral contraceptive. She denies any H/O miscarriage, no H/O DVT/PE. Denies any fever, cough, shortness of breath or any other constitutional symptoms. In ED , stroke call was called, her CTH  showed no acute pathology, CTA no large vessel occlusion. Her symptoms almost resolved at that time, NIH:1 for sensory impairment. After discussion with patient, TPA was not given as her symptoms were mild, nondisabling and resolving. She was kept under observation in ED overnight for further workup. Her MRI of brain revealed acute infarct of right inferior cerebellar hemisphere. She is being admitted in stroke unit for further evaluation and management.  (22 Jul 2021 10:52)      PAST MEDICAL & SURGICAL HISTORY:  No pertinent past medical history    No significant past surgical history        Hospital Course:    TODAY'S SUBJECTIVE & REVIEW OF SYMPTOMS:     Constitutional WNL   Cardio WNL   Resp WNL   GI WNL  Heme WNL  Endo WNL  Skin WNL  MSK WNL  Neuro WNL  Cognitive WNL  Psych WNL      MEDICATIONS  (STANDING):  aspirin enteric coated 81 milliGRAM(s) Oral daily  atorvastatin 80 milliGRAM(s) Oral at bedtime  nicotine - 21 mG/24Hr(s) Patch 1 Patch Transdermal daily    MEDICATIONS  (PRN):      FAMILY HISTORY:  No pertinent family history in first degree relatives        Allergies    No Known Allergies    Intolerances        SOCIAL HISTORY:    [  ] Etoh  [x  ] Smoking  [  ] Substance abuse     Home Environment:  [  ] Home Alone  [  ] Lives with Family  [  ] Home Health Aid    Dwelling:  [  ] Apartment  [  ] Private House  [  ] Adult Home  [  ] Skilled Nursing Facility      [  ] Short Term  [  ] Long Term  [  ] Stairs       Elevator [  ]    FUNCTIONAL STATUS PTA: (Check all that apply)  Ambulation: [  x ]Independent    [  ] Dependent     [  ] Non-Ambulatory  Assistive Device: [  ] SA Cane  [  ]  Q Cane  [  ] Walker  [  ]  Wheelchair  ADL : [ x ] Independent  [  ]  Dependent       Vital Signs Last 24 Hrs  T(C): 36.3 (22 Jul 2021 07:17), Max: 37.1 (21 Jul 2021 17:03)  T(F): 97.4 (22 Jul 2021 07:17), Max: 98.7 (21 Jul 2021 17:03)  HR: 80 (22 Jul 2021 07:17) (80 - 91)  BP: 116/66 (22 Jul 2021 07:17) (115/63 - 156/73)  BP(mean): --  RR: 16 (22 Jul 2021 07:17) (15 - 18)  SpO2: 99% (22 Jul 2021 07:17) (99% - 99%)      PHYSICAL EXAM: Alert & Oriented X3  GENERAL: NAD, well-groomed, well-developed  HEAD:  Atraumatic, Normocephalic  EYES: EOMI, PERRLA, conjunctiva and sclera clear  NECK: Supple, No JVD, Normal thyroid  CHEST/LUNG: Clear to percussion bilaterally; No rales, rhonchi, wheezing, or rubs  HEART: Regular rate and rhythm; No murmurs, rubs, or gallops  ABDOMEN: Soft, Nontender, Nondistended; Bowel sounds present  EXTREMITIES:  2+ Peripheral Pulses, No clubbing, cyanosis, or edema    NERVOUS SYSTEM:  Cranial Nerves 2-12 intact [  ] Abnormal  [  ]  ROM: WFL all extremities [  ]  Abnormal [  ]  Motor Strength: WFL all extremities  [x  ]  Abnormal [  ]  slight decreased rapid alt movement right hand vs left and slight decreased rapid right foot tapping compared to left  Sensation: intact to light touch [ x ] Abnormal [  ]  Reflexes: Symmetric [  ]  Abnormal [  ]    FUNCTIONAL STATUS:  Bed Mobility: Independent [  ]  Supervision [  ]  Needs Assistance [  ]  N/A [  ]  Transfers: Independent [  ]  Supervision [  ]  Needs Assistance [  ]  N/A [  ]   Ambulation: Independent [  ]  Supervision [  ]  Needs Assistance [  ]  N/A [  ]  ADL: Independent [  ] Requires Assistance [  ] N/A [  ]      LABS:                        14.1   14.00 )-----------( 419      ( 21 Jul 2021 09:30 )             42.5     07-21    137  |  103  |  17  ----------------------------<  107<H>  3.7   |  20  |  0.7    Ca    9.0      21 Jul 2021 09:30    TPro  6.8  /  Alb  4.1  /  TBili  <0.2  /  DBili  x   /  AST  12  /  ALT  11  /  AlkPhos  72  07-21    PT/INR - ( 21 Jul 2021 09:30 )   PT: 11.60 sec;   INR: 1.01 ratio         PTT - ( 21 Jul 2021 09:30 )  PTT:30.9 sec      RADIOLOGY & ADDITIONAL STUDIES:    Assesment:

## 2021-07-22 NOTE — H&P ADULT - NSHPPHYSICALEXAM_GEN_ALL_CORE
General: AAOx4, no acute distress.  Neck: no thyromegaly, no lymphadenoathy.  Lung: clear to aus B/L  Heart: S1, S2, no murmur.  Abdomen: soft , nontender, BS+ve.  Ext: no edema.  skin: erythematous patchy spots noted over patien's thigh B/L  Mental status: Awake, alert and oriented x3.  Recent and remote memory intact.  Naming, repetition and comprehension intact.  Attention/concentration intact.  No dysarthria, no aphasia.  Fund of knowledge appropriate.    Cranial nerves: Pupils equally round and reactive to light, visual fields full, no nystagmus, extraocular muscles intact, V1 through V3 intact bilaterally and symmetric, face symmetric, hearing intact to finger rub, palate elevation symmetric, tongue was midline.  Motor:  MRC grading 5/5 b/l UE/LE.   strength 5/5.  Normal tone and bulk.  No abnormal movements.    Sensation: Intact to light touch, proprioception, and pinprick.   Coordination: No dysmetria on finger-to-nose and heel-to-shin.  No dysdiadokinesia.  Reflexes: 2+ in bilateral UE/LE, downgoing toes bilaterally. (-) Mays.  Gait: Narrow and steady. No ataxia.  Romberg negative  NIH STROKE SCALE  Item	                                                        Score  1 a.	Level of Consciousness	               	0  1 b. LOC Questions	                                0  1 c.	LOC Commands	                               	0  2.	Best Gaze	                                        0  3.	Visual	                                                0  4.	Facial Palsy	                                        0  5 a.	Motor Arm - Left	                                0  5 b.	Motor Arm - Right	                        0  6 a.	Motor Leg - Left	                                0  6 b.	Motor Leg - Right	                                0  7.	Limb Ataxia	                                        0  8.	Sensory	                                                0  9.	Language	                                        0  10.	Dysarthria	                                        0  11.	Extinction and Inattention  	        0  ______________________________________  TOTAL	                                                        0    Total NIHSS on admission: 1    NIHSS today:0 General: AAOx4, no acute distress.  Neck: no thyromegaly, no lymphadenopathy.  Lung: clear to aus B/L  Heart: S1, S2, no murmur.  Abdomen: soft , nontender, BS+ve.  Ext: no edema.  skin: erythematous patchy spots noted over patient's thigh B/L  Mental status: Awake, alert and oriented x3.  Recent and remote memory intact.  Naming, repetition and comprehension intact.  Attention/concentration intact.  No dysarthria, no aphasia.  Fund of knowledge appropriate.    Cranial nerves: Pupils equally round and reactive to light, visual fields full, right-beating horizontal nystagmus with rightward gaze, extraocular muscles intact, V1 through V3 intact bilaterally and symmetric, face symmetric, hearing intact to finger rub, palate elevation symmetric, tongue was midline.  Motor:  MRC grading 5/5 b/l UE/LE.   strength 5/5.  Normal tone and bulk.  No abnormal movements.  right pronator drift  Sensation: Intact to light touch, proprioception, and pinprick.   Coordination: No dysmetria on finger-to-nose and heel-to-shin.  No dysdiadochokinesia.  Reflexes: 2+ in bilateral UE/LE, downgoing toes bilaterally. (-) Mays.  Gait: Narrow and steady. No ataxia.  Romberg negative  NIH STROKE SCALE  Item	                                                        Score  1 a.	Level of Consciousness	               	0  1 b. LOC Questions	                                0  1 c.	LOC Commands	                               	0  2.	Best Gaze	                                        0  3.	Visual	                                                0  4.	Facial Palsy	                                        0  5 a.	Motor Arm - Left	                                0  5 b.	Motor Arm - Right	                        0  6 a.	Motor Leg - Left	                                0  6 b.	Motor Leg - Right	                                0  7.	Limb Ataxia	                                        0  8.	Sensory	                                                0  9.	Language	                                        0  10.	Dysarthria	                                        0  11.	Extinction and Inattention  	        0  ______________________________________  TOTAL	                                                        0    Total NIHSS on admission: 1    NIHSS today:0

## 2021-07-22 NOTE — H&P ADULT - ASSESSMENT
36 y/o F with no significant PMH has presented to ED c/o dizziness, numbness of face and difficulty swallowing 30 min prior to arrival to ED. stroke call was called, her CTH  showed no acute patholgy, CTA no large vessel occlusion. Her symptoms almost resolved at that time, NIH:1 for sensory impairment. After discussion with patient, TPA was not given as her symptoms were mild and resolving. She was kept under observation in ED overnight for further workup Her MRI of brain revealed acute infarct of right inferior cerebellar hemisphere. She is being admitted in stroke unit for further evaluation and management.   # Acute infarct of right inferior cerebellum:  - Imaging reviewed, TPA not given as intial imaging negative and resolving symptoms.  - S/P Aspirin and Plavix loading dose  - Started Aspirin 81 mg   - Started Plavix 75 mg  - Started Atorvastain 80 mg .  - neurocheck every 4 hours.  - F/U cardiology consult for possible EDA to r/o cardioembolic cause.  - F/U hypercoagulable workup.  - F/u autoimmune workup including ESR, CRP, BRYNN, ANCA, Hep-B,c,Anit-cardiolipin, Anti-dsDNA, antiphospholipid Ab.  - Physiatry consult.  - PT/OT evaluation.  #Others;  -DVT PPX: SCD for now, will start pharmacological PPX once hyepercoagulable work up is sent.  - Diet: NPO for now, until swallow eval.  - Code status: full code.  - Disposition: stroke unit 36 y/o F with no significant PMH has presented to ED c/o dizziness, numbness of face and difficulty swallowing 30 min prior to arrival to ED. stroke call was called, her CTH  showed no acute patholgy, CTA no large vessel occlusion. Her symptoms almost resolved at that time, NIH:1 for sensory impairment. After discussion with patient, TPA was not given as her symptoms were mild and resolving. She was kept under observation in ED overnight for further workup Her MRI of brain revealed acute infarct of right inferior cerebellar hemisphere. She is being admitted in stroke unit for further evaluation and management.   # Acute ischemic infarct of right inferior cerebellum: R PICA distribution  - Imaging reviewed, TPA not given due to mild, nondisabling symptoms  - S/P Aspirin and Plavix loading dose  - Started Aspirin 81 mg   - Started Plavix 75 mg  - Started Atorvastain 80 mg .  - neurocheck every 4 hours.  - F/U cardiology consult for possible EDA to r/o cardioembolic cause.  - F/U hypercoagulable workup.  - F/u autoimmune workup including ESR, CRP, BRYNN, ANCA, Hep-B ,c, Anti-cardiolipin, Anti-dsDNA, antiphospholipid Ab.  - Physiatry consult.  - PT/OT evaluation.  #Others;  -DVT PPX: SCD for now, will start pharmacological PPX once hypercoagulable work up is sent.  - Diet: NPO for now, until swallow eval.  - Code status: full code.  - Disposition: stroke unit

## 2021-07-22 NOTE — H&P ADULT - NSHPSOCIALHISTORY_GEN_ALL_CORE
current smoker smokes one pack a day,   Alcohol : only drinks socially.  Drugs: no H/O substance abuse.

## 2021-07-22 NOTE — OCCUPATIONAL THERAPY INITIAL EVALUATION ADULT - PERTINENT HX OF CURRENT PROBLEM, REHAB EVAL
36 y/o F with PMH of current tobacco use (1ppd), who presented to ED c/o dizziness, numbness of face and difficulty swallowing 30 min prior to arrival to ED. She was sitting with her friends for breakfast, when she suddenly felt that the room is spinning. she immediately lie down then she started to experience numbness of her face and she was having trouble swallowing.

## 2021-07-22 NOTE — PHYSICAL THERAPY INITIAL EVALUATION ADULT - GENERAL OBSERVATIONS, REHAB EVAL
Pt laying semifowler in bed in NAD. Agreeable to PT. + tele, + IV to RUE (disconnected). Significant other joined evaluation after it began.

## 2021-07-22 NOTE — ED ADULT NURSE REASSESSMENT NOTE - NS ED NURSE REASSESS COMMENT FT1
Pt. assessed. Denies pain or discomfort. NIHSS 0. No neuro deficits. Ambulates steady with no assist. Resting in stretcher, safety prec maintained. Will cont to monitor.

## 2021-07-22 NOTE — H&P ADULT - NSHPLABSRESULTS_GEN_ALL_CORE
14.1                 137  | 20   | 17           14.00 >-----------< 419     ------------------------< 107                   42.5                 3.7  | 103  | 0.7                                          Ca 9.0   Mg x     Ph x      Prothrombin Time, Plasma: 11.60 sec   INR: 1.01:Troponin T, Serum: <0.01 ng/mLCOVID-19 PCR: NotDetec:     < from: CT Brain Stroke Protocol (07.21.21 @ 09:33) >    No CT evidence of large acute territorial infarct.    No evidence of acute intracranial pathology. No mass effect, midline shift or intracranial hemorrhage.    < end of copied text >    < from: CT Perfusion w/ Maps w/ IV Cont (07.21.21 @ 09:57) >    CT PERFUSION:  No evidence of perfusion abnormality.    CTA HEAD:  No evidence of flow-limiting stenosis, occlusion or aneurysm.    CTA NECK:  No evidence of carotid or vertebral artery stenosis.    < end of copied text >    < from: MR Head No Cont (07.21.21 @ 22:55) >    Focal acute infarcts involving the right inferior cerebellar hemisphere. No evidence of hemorrhage.    A single nonspecificfocus of signal abnormality is noted within the right frontal subcortical region. Finding may represent sequela of mild chronic microvascular change, demyelination or gliosis.      < end of copied text >    < from: 12 Lead ECG (07.21.21 @ 09:50) >    Diagnosis Line Normal sinus rhythm  Normal ECG      < end of copied text >

## 2021-07-22 NOTE — H&P ADULT - HISTORY OF PRESENT ILLNESS
36 y/o F with no significant PMH has presented to ED c/o dizziness, numbness of face and difficulty swallowing 30 min prior to arrival to ED. She was sitting with her friends for breakfast, when she suddenly felt that the room is spinning. she immediately lie down then she started to experience numbness of her face and she was having trouble swallowing. She never had similar symptom in the past. She used to have dizziness few years ago, but at that time it was only when she changed position. She denies any headache, blurry vision, slurring of speech, weakness, tingling or numbness on any other part of body. She has recently been treated with antibiotics for UTI. No h/O stroke in the past. She is not taking any medication now , not on oral contraceptive.She denies any H/O miscarriage, no H/O DVT/PE. Denies any fever, cough, shortness of breath or any other constitutional symptoms. In ED , stroke call was called, her CTH  showed no acute patholgy, CTA no large vessel occlusion. Her symptoms almost resolved at that time, NIH:1 for sensory impairment. After discussion with patient, TPA was not given as her symptoms were mild and resolving. She was kept under observation in ED overnight for further workup Her MRI of brain revealed acute infarct of right inferior cerebellar hemisphere. She is being admitted in stroek unit for further evaluation and management.  36 y/o F with PMH of current tobacco use (1ppd), who presented to ED c/o dizziness, numbness of face and difficulty swallowing 30 min prior to arrival to ED. She was sitting with her friends for breakfast, when she suddenly felt that the room is spinning. she immediately lie down then she started to experience numbness of her face and she was having trouble swallowing. She never had similar symptom in the past. She used to have dizziness few years ago, but at that time it was only when she changed position. She denies any headache, blurry vision, slurring of speech, weakness, tingling or numbness on any other part of body. She has recently been treated with antibiotics for UTI. No h/O stroke in the past. She is not taking any medication now , not on oral contraceptive. She denies any H/O miscarriage, no H/O DVT/PE. Denies any fever, cough, shortness of breath or any other constitutional symptoms. In ED , stroke call was called, her CTH  showed no acute pathology, CTA no large vessel occlusion. Her symptoms almost resolved at that time, NIH:1 for sensory impairment. After discussion with patient, TPA was not given as her symptoms were mild, nondisabling and resolving. She was kept under observation in ED overnight for further workup. Her MRI of brain revealed acute infarct of right inferior cerebellar hemisphere. She is being admitted in stroke unit for further evaluation and management.

## 2021-07-22 NOTE — CONSULT NOTE ADULT - ATTENDING COMMENTS
34 y/o F with no significant PMH has presented to ED c/o dizziness, numbness of face and difficulty swallowing 30 min prior to arrival to ED.  She was kept under observation in ED overnight for further workup Her MRI of brain revealed acute infarct of right inferior cerebellar hemisphere. She is being admitted in stroke unit for further evaluation and management.    Impression:     -Acute infarct of right inferior cerebellar hemisphere      Recommendations   -telemetry monitor   -cont with aspirin and Lipitor  -2echo with  bubble study  -scheduled for EDA tomorrow   -NPO after midnight    - EP for MCOT as outpatient

## 2021-07-22 NOTE — PHYSICAL THERAPY INITIAL EVALUATION ADULT - DIAGNOSIS, PT EVAL
Coordinator called pt to let her know [facility] has decided to take her off of our kidney transplant wait list d/t her BMI. We welcome her back as a referral if she is able to reach her goal BMI of 35 (goal weight <185lbs). Pt verbalizes understanding and has no further questions.    [facility] updated, immunology notified, wait list removal letter routed to admin staff.   Gait dysfxn 2/2 cerebellar CVA

## 2021-07-22 NOTE — DISCHARGE NOTE NURSING/CASE MANAGEMENT/SOCIAL WORK - PATIENT PORTAL LINK FT
You can access the FollowMyHealth Patient Portal offered by [facility] by registering at the following website: [email or web address]. By joining [facility]’s FollowMyHealth portal, you will also be able to view your health information using other applications (apps) compatible with our system.

## 2021-07-22 NOTE — SWALLOW BEDSIDE ASSESSMENT ADULT - SLP GENERAL OBSERVATIONS
Pt received sitting upright in bed on RA w/  at bedside. Pt able to engage in verbal discourse and follows commands. Speech is clear.  Pt reports that her symptoms have resolved.

## 2021-07-22 NOTE — SWALLOW BEDSIDE ASSESSMENT ADULT - SLP PERTINENT HISTORY OF CURRENT PROBLEM
Pt is 34 y/o F with PMH of current tobacco use (1ppd),  presented to ED c/o dizziness, numbness of face and difficulty swallowing 30 min prior to arrival to ED. MRI: Acute infarct of right inferior cerebellar hemisphere. Pt reports all of her symptoms resolved.

## 2021-07-22 NOTE — CONSULT NOTE ADULT - ASSESSMENT
IMPRESSION: Rehab of CVA, cerebellar    PRECAUTIONS: [ x ] Cardiac  [  ] Respiratory  [  ] Seizures [  ] Contact Isolation  [  ] Droplet Isolation  [  ] Other    Weight Bearing Status:     RECOMMENDATION: Smoking cessation; no need for rest PT and OT at this time.x  Out of Bed to Chair     DVT/Decubiti Prophylaxis    REHAB PLAN:     [ x  ] Bedside P/T 3-5 times a week   [x   ]   Bedside O/T  2-3 times a week             [   ] No Rehab Therapy Indicated                   [   ]  Speech Therapy   Conditioning/ROM                                    ADL  Bed Mobility                                               Conditioning/ROM  Transfers                                                     Bed Mobility  Sitting /Standing Balance                         Transfers                                        Gait Training                                               Sitting/Standing Balance  Stair Training [   ]Applicable                    Home equipment Eval                                                                        Splinting  [   ] Only      GOALS:   ADL   [   ]   Independent                    Transfers  [   ] Independent                          Ambulation  [   ] Independent     [    ] With device                            [   ]  CG                                                         [   ]  CG                                                                  [   ] CG                            [    ] Min A                                                   [   ] Min A                                                              [   ] Min  A          DISCHARGE PLAN:   [   ]  Good candidate for Intensive Rehabilitation/Hospital based-4A [facility]                                             Will tolerate 3hrs Intensive Rehab Daily                                       [    ]  Short Term Rehab in Skilled Nursing Facility                                       [  x  ]  Home with Outpatient or VN services She doesn't require PT or Ot at this time.                                         [    ]  Possible Candidate for Intensive Hospital based Rehab

## 2021-07-22 NOTE — ED CDU PROVIDER SUBSEQUENT DAY NOTE - PROGRESS NOTE DETAILS
patient resting comfortably. no complaints. NIHSS - 0. will continue to observe MRI reveals: focal acute infarcts involving the right inferior cerebellar hemisphere. No evidence of hemorrhage. A single nonspecific focus of signal abnormality is noted within the right frontal subcortical region. Finding may represent sequela of mild chronic microvascular change, demyelination or gliosis.   Neurology resident spoke with patient ; will admit to stroke unit under Dr. Castillo and neurology resident states that she will inform MAR. Patient amenable to admission.

## 2021-07-23 ENCOUNTER — TRANSCRIPTION ENCOUNTER (OUTPATIENT)
Age: 36
End: 2021-07-23

## 2021-07-23 VITALS — HEART RATE: 80 BPM | RESPIRATION RATE: 18 BRPM | DIASTOLIC BLOOD PRESSURE: 72 MMHG | SYSTOLIC BLOOD PRESSURE: 115 MMHG

## 2021-07-23 LAB
ALBUMIN SERPL ELPH-MCNC: 3.9 G/DL — SIGNIFICANT CHANGE UP (ref 3.5–5.2)
ALP SERPL-CCNC: 69 U/L — SIGNIFICANT CHANGE UP (ref 30–115)
ALT FLD-CCNC: 10 U/L — SIGNIFICANT CHANGE UP (ref 0–41)
ANION GAP SERPL CALC-SCNC: 8 MMOL/L — SIGNIFICANT CHANGE UP (ref 7–14)
APCR PPP: 3.17 RATIO — SIGNIFICANT CHANGE UP
AST SERPL-CCNC: 13 U/L — SIGNIFICANT CHANGE UP (ref 0–41)
AT III ACT/NOR PPP CHRO: 99 % — SIGNIFICANT CHANGE UP (ref 85–135)
BILIRUB SERPL-MCNC: 0.4 MG/DL — SIGNIFICANT CHANGE UP (ref 0.2–1.2)
BUN SERPL-MCNC: 13 MG/DL — SIGNIFICANT CHANGE UP (ref 10–20)
CALCIUM SERPL-MCNC: 9 MG/DL — SIGNIFICANT CHANGE UP (ref 8.5–10.1)
CHLORIDE SERPL-SCNC: 105 MMOL/L — SIGNIFICANT CHANGE UP (ref 98–110)
CHOLEST SERPL-MCNC: 184 MG/DL — SIGNIFICANT CHANGE UP
CO2 SERPL-SCNC: 26 MMOL/L — SIGNIFICANT CHANGE UP (ref 17–32)
CREAT SERPL-MCNC: 0.7 MG/DL — SIGNIFICANT CHANGE UP (ref 0.7–1.5)
GLUCOSE SERPL-MCNC: 75 MG/DL — SIGNIFICANT CHANGE UP (ref 70–99)
HBV SURFACE AG SER-ACNC: SIGNIFICANT CHANGE UP
HCT VFR BLD CALC: 42.3 % — SIGNIFICANT CHANGE UP (ref 37–47)
HCV AB S/CO SERPL IA: 0.03 COI — SIGNIFICANT CHANGE UP
HCV AB SERPL-IMP: SIGNIFICANT CHANGE UP
HCYS SERPL-MCNC: 10.8 UMOL/L — SIGNIFICANT CHANGE UP
HDLC SERPL-MCNC: 41 MG/DL — LOW
HGB BLD-MCNC: 14.1 G/DL — SIGNIFICANT CHANGE UP (ref 12–16)
LIPID PNL WITH DIRECT LDL SERPL: 114 MG/DL — HIGH
MCHC RBC-ENTMCNC: 29.1 PG — SIGNIFICANT CHANGE UP (ref 27–31)
MCHC RBC-ENTMCNC: 33.3 G/DL — SIGNIFICANT CHANGE UP (ref 32–37)
MCV RBC AUTO: 87.4 FL — SIGNIFICANT CHANGE UP (ref 81–99)
NON HDL CHOLESTEROL: 143 MG/DL — HIGH
NRBC # BLD: 0 /100 WBCS — SIGNIFICANT CHANGE UP (ref 0–0)
PLATELET # BLD AUTO: 353 K/UL — SIGNIFICANT CHANGE UP (ref 130–400)
POTASSIUM SERPL-MCNC: 4.7 MMOL/L — SIGNIFICANT CHANGE UP (ref 3.5–5)
POTASSIUM SERPL-SCNC: 4.7 MMOL/L — SIGNIFICANT CHANGE UP (ref 3.5–5)
PROT SERPL-MCNC: 6.5 G/DL — SIGNIFICANT CHANGE UP (ref 6–8)
RBC # BLD: 4.84 M/UL — SIGNIFICANT CHANGE UP (ref 4.2–5.4)
RBC # FLD: 13.3 % — SIGNIFICANT CHANGE UP (ref 11.5–14.5)
SODIUM SERPL-SCNC: 139 MMOL/L — SIGNIFICANT CHANGE UP (ref 135–146)
TRIGL SERPL-MCNC: 161 MG/DL — HIGH
WBC # BLD: 9.22 K/UL — SIGNIFICANT CHANGE UP (ref 4.8–10.8)
WBC # FLD AUTO: 9.22 K/UL — SIGNIFICANT CHANGE UP (ref 4.8–10.8)

## 2021-07-23 PROCEDURE — 93312 ECHO TRANSESOPHAGEAL: CPT | Mod: 26,XU

## 2021-07-23 PROCEDURE — 93320 DOPPLER ECHO COMPLETE: CPT | Mod: 26

## 2021-07-23 PROCEDURE — 99238 HOSP IP/OBS DSCHRG MGMT 30/<: CPT

## 2021-07-23 PROCEDURE — 93325 DOPPLER ECHO COLOR FLOW MAPG: CPT | Mod: 26

## 2021-07-23 PROCEDURE — 99222 1ST HOSP IP/OBS MODERATE 55: CPT

## 2021-07-23 RX ORDER — ASPIRIN/CALCIUM CARB/MAGNESIUM 324 MG
1 TABLET ORAL
Qty: 30 | Refills: 0
Start: 2021-07-23 | End: 2021-08-21

## 2021-07-23 RX ORDER — CLOPIDOGREL BISULFATE 75 MG/1
1 TABLET, FILM COATED ORAL
Qty: 21 | Refills: 0
Start: 2021-07-23 | End: 2021-08-12

## 2021-07-23 RX ORDER — DIPHENHYDRAMINE HYDROCHLORIDE AND LIDOCAINE HYDROCHLORIDE AND ALUMINUM HYDROXIDE AND MAGNESIUM HYDRO
10 KIT ONCE
Refills: 0 | Status: DISCONTINUED | OUTPATIENT
Start: 2021-07-23 | End: 2021-07-23

## 2021-07-23 RX ORDER — ATORVASTATIN CALCIUM 80 MG/1
1 TABLET, FILM COATED ORAL
Qty: 30 | Refills: 0
Start: 2021-07-23 | End: 2021-08-21

## 2021-07-23 RX ORDER — ACETAMINOPHEN 500 MG
1000 TABLET ORAL ONCE
Refills: 0 | Status: DISCONTINUED | OUTPATIENT
Start: 2021-07-23 | End: 2021-07-23

## 2021-07-23 RX ADMIN — Medication 81 MILLIGRAM(S): at 12:11

## 2021-07-23 RX ADMIN — CLOPIDOGREL BISULFATE 75 MILLIGRAM(S): 75 TABLET, FILM COATED ORAL at 12:11

## 2021-07-23 RX ADMIN — Medication 1 PATCH: at 06:06

## 2021-07-23 NOTE — CHART NOTE - NSCHARTNOTEFT_GEN_A_CORE
POST OPERATIVE PROCEDURAL DOCUMENTATION  PRE-OP DIAGNOSIS:  CVA      POST-OP DIAGNOSIS:  CVA    PROCEDURE: Transesophageal echocardiogram    Primary Physician:  Dr. Burrell  Assistant: Dr. Soni    ANESTHESIA TYPE  [  ] General Anesthesia  [ x ] Conscious Sedation  [  ] Local/Regional    CONDITION  [  ] Critical  [  ] Serious  [  ] Fair  [ x ] Good    SPECIMENS REMOVED (IF APPLICABLE): N/A    IMPLANTS (IF APPLICABLE): None    ESTIMATED BLOOD LOSS: None    COMPLICATIONS: None      FINDINGS:    After risks and benefits of procedures were explained, informed consent was obtained and placed in chart. Refer to Anesthesia note for sedation details.  The EDA probe was passed into the esophagus without difficulty.  Transesophageal and transgastric images were obtained.  The EDA probe was removed without difficulty and examined.  There was no evidence for bleeding.  The patient tolerated the procedure well without any immediate EDA-related complications.        Left Ventricle:   Left ventricular ejection fraction, by visual estimation, is 55 to 60%.    Right Ventricle:  Normal right ventricular size and function.    Left Atrium:  No left atrial appendage thrombus is seen and normal left atrial appendage velocities.    Color flow doppler and intravenous injection of agitated saline demonstrates the presence of an intact intra atrial septum.    Right Atrium:  Normal right atrial size.    Mitral Valve:  No evidence of  mitral valve stenosis.  Mild mitral valve regurgitation is seen.    Tricuspid Valve:  Mild tricuspid regurgitation is visualized.    Aortic Valve:  The aortic valve is trileaflet.  No evidence of aortic valve regurgitation is seen.    Pulmonic Valve:  No indication of pulmonic valve regurgitation.    Aorta:  Simple atheroma seen in the aortic arch.    Shunts:  Agitated saline contrast was given intravenously to evaluate for intracardiac shunting.        DIAGNOSIS/IMPRESSION:  No cardioembolic sources found for CVA    PLAN OF CARE:  return to floor  f/u with neurology POST OPERATIVE PROCEDURAL DOCUMENTATION  PRE-OP DIAGNOSIS:  CVA      POST-OP DIAGNOSIS:  CVA    PROCEDURE: Transesophageal echocardiogram    Primary Physician:  Dr. Burrell  Assistant: Dr. Soni    ANESTHESIA TYPE  [  ] General Anesthesia  [ x ] Conscious Sedation  [  ] Local/Regional    CONDITION  [  ] Critical  [  ] Serious  [  ] Fair  [ x ] Good    SPECIMENS REMOVED (IF APPLICABLE): N/A    IMPLANTS (IF APPLICABLE): None    ESTIMATED BLOOD LOSS: None    COMPLICATIONS: None      FINDINGS:    After risks and benefits of procedures were explained, informed consent was obtained and placed in chart. Refer to Anesthesia note for sedation details.  The EDA probe was passed into the esophagus without difficulty.  Transesophageal and transgastric images were obtained.  The EDA probe was removed without difficulty and examined.  There was no evidence for bleeding.  The patient tolerated the procedure well without any immediate EDA-related complications.        < from: Transesophageal Echocardiogram (07.23.21 @ 15:33) >    Summary:   1. Left ventricular ejection fraction, by visual estimation, is 55 to 60%.   2. Normal global left ventricular systolic function.   3. Mild mitral valve regurgitation.   4. Mild tricuspid regurgitation.   5. Normal left atrial size.   6. No left atrial appendage thrombus and normal left atrial appendage velocities.   7. Color flow doppler and intravenous injection of agitated saline demonstrates the presence of an intact intra atrial septum.   8. Normal right atrial size.   9. Simple atheroma seen in the aortic arch.      < end of copied text >        DIAGNOSIS/IMPRESSION:  No cardioembolic sources found for CVA    PLAN OF CARE:  return to floor  f/u with neurology

## 2021-07-23 NOTE — CONSULT NOTE ADULT - ASSESSMENT
36 y/o F with PMH of current tobacco use (1ppd), who presented to ED c/o dizziness, founnd to have acute CVA. EDA neg for thrombus/PFO.    Impression:  Acute CVA of right inferior cerebellar hemisphere    Plan:  - Recommend MCOT for ongoing monitoring for afib  - Fu in office in 5-6 weeks to discuss results and possible loop recorder  - Cont current management    MCOT placed and education provided.    MCOT Instructions:  - Please wear now through 8/22  - Please return package back with prepaid envelope the day after your monitoring is complete (sensor, monitor, chargers, etc)  - Sensor must be charged every 5-7 days  - Monitor must be cahrged daily  - Change patch once a week, sooner if soiled or not adhereing to skin  - Fu in 5-6 weeks with Dr. Mcmahon to discuss results

## 2021-07-23 NOTE — DISCHARGE NOTE PROVIDER - CARE PROVIDER_API CALL
Too Bryant)  Neurology  [address] [place] [address]  Phone: [phone]  Fax: [phone]  Follow Up Time: 1 week   Too Bryant)  Neurology  [address] [place] [address]  Phone: [phone]  Fax: [phone]  Follow Up Time: 1 week    Jen Mcmahon)  Cardiovascular Disease; Internal Medicine  [address] [facility], [address] [id number]  Phone: [phone]  Fax: [phone]  Follow Up Time: 1 month

## 2021-07-23 NOTE — CONSULT NOTE ADULT - ATTENDING COMMENTS
Pt with CVA    Based on our literature, we recommend an implantable loop recorder as the 30 day event monitor has been shown to not be as effective for the evaluation of occult AFib for cryptogenic strokes. We discussed the risks/benefits/alternatives, nature of procedure, and follow up care after device is implanted. I answered all questions in detail. Patient expressed understanding of the discussion and would like to defer loop implant. She would like a 30 day MCOT for now.     Follow up in 4-6 weeks to review results.

## 2021-07-23 NOTE — DISCHARGE NOTE PROVIDER - PROVIDER TOKENS
PROVIDER:[TOKEN:[[id number]],FOLLOWUP:[1 week]] PROVIDER:[TOKEN:[[id number]],FOLLOWUP:[1 week]],PROVIDER:[TOKEN:[[id number]],FOLLOWUP:[1 month]]

## 2021-07-23 NOTE — PRE-ANESTHESIA EVALUATION ADULT - NSANTHOSAYNRD_GEN_A_CORE
No. HAMMAD screening performed.  STOP BANG Legend: 0-2 = LOW Risk; 3-4 = INTERMEDIATE Risk; 5-8 = HIGH Risk

## 2021-07-23 NOTE — CONSULT NOTE ADULT - SUBJECTIVE AND OBJECTIVE BOX
Patient is a 35y old  Female who presents with a chief complaint of vertigo, facial numbness (23 Jul 2021 13:58)    HPI:  36 y/o F with PMH of current tobacco use (1ppd), who presented to ED c/o dizziness, numbness of face and difficulty swallowing 30 min prior to arrival to ED. She was sitting with her friends for breakfast, when she suddenly felt that the room is spinning. she immediately lie down then she started to experience numbness of her face and she was having trouble swallowing. She never had similar symptom in the past. She used to have dizziness few years ago, but at that time it was only when she changed position. She denies any headache, blurry vision, slurring of speech, weakness, tingling or numbness on any other part of body. She has recently been treated with antibiotics for UTI. No h/O stroke in the past. She is not taking any medication now , not on oral contraceptive. She denies any H/O miscarriage, no H/O DVT/PE. Denies any fever, cough, shortness of breath or any other constitutional symptoms. In ED , stroke call was called, her CTH  showed no acute pathology, CTA no large vessel occlusion. Her symptoms almost resolved at that time, NIH:1 for sensory impairment. After discussion with patient, TPA was not given as her symptoms were mild, nondisabling and resolving. She was kept under observation in ED overnight for further workup. Her MRI of brain revealed acute infarct of right inferior cerebellar hemisphere. She is being admitted in stroke unit for further evaluation and management.  (22 Jul 2021 10:52)      EP consulted for MCOT placement.     REVIEW OF SYSTEMS    [x] A ten-point review of systems was otherwise negative except as noted.  [ ] Due to altered mental status/intubation, subjective information were not able to be obtained from the patient. History was obtained, to the extent possible, from review of the chart and collateral sources of information.      PAST MEDICAL & SURGICAL HISTORY:  No pertinent past medical history    No significant past surgical history        Home Medications:      Allergies:    No Known Allergies      PREVIOUS DIAGNOSTIC TESTING:      ECHO  FINDINGS: < from: Transesophageal Echocardiogram (07.23.21 @ 15:33) >  Summary:   1. Left ventricular ejection fraction, by visual estimation, is 55 to 60%.   2. Normal global left ventricular systolic function.   3. Mild mitral valve regurgitation.   4. Mild tricuspid regurgitation.   5. Normal left atrial size.   6. No left atrial appendage thrombus and normal left atrial appendage velocities.   7. Color flow doppler and intravenous injection of agitated saline demonstrates the presence of an intact intra atrial septum.   8. Normal right atrial size.   9. Simple atheroma seen in the aortic arch.    < end of copied text >      FAMILY HISTORY:  No pertinent family history in first degree relatives        SOCIAL HISTORY:  CIGARETTES: current smoker, 1PPD  ALCOHOL: occasional ETOH       MEDICATIONS  (STANDING):  aspirin enteric coated 81 milliGRAM(s) Oral daily  atorvastatin 80 milliGRAM(s) Oral at bedtime  clopidogrel Tablet 75 milliGRAM(s) Oral daily  nicotine - 21 mG/24Hr(s) Patch 1 Patch Transdermal daily    MEDICATIONS  (PRN):      Vital Signs Last 24 Hrs  T(C): 36.8 (22 Jul 2021 20:27), Max: 36.8 (22 Jul 2021 20:27)  T(F): 98.2 (22 Jul 2021 20:27), Max: 98.2 (22 Jul 2021 20:27)  HR: 80 (23 Jul 2021 13:30) (72 - 82)  BP: 115/72 (23 Jul 2021 13:30) (115/72 - 124/60)  BP(mean): 95 (23 Jul 2021 13:30) (95 - 95)  RR: 18 (23 Jul 2021 13:30) (18 - 18)  SpO2: 100% (23 Jul 2021 10:14) (99% - 100%)    PHYSICAL EXAM:    GENERAL: Well appearing female,  In no apparent distress, well nourished, and hydrated.  HEART: Regular rate and rhythm; No murmurs, rubs, or gallops.  PULMONARY: Clear to auscultation and perfusion.  No rales, wheezing, or rhonchi bilaterally.  ABDOMEN: Soft, Nontender, Nondistended; Bowel sounds present  EXTREMITIES:  2+ Peripheral Pulses, No clubbing, cyanosis, or edema  NEUROLOGICAL: AO x4, WOOD, speech clear      INTERPRETATION OF TELEMETRY: SR 70 BPM    ECG: < from: 12 Lead ECG (07.21.21 @ 09:50) >  Ventricular Rate 86 BPM    Atrial Rate 86 BPM    P-R Interval 116 ms    QRS Duration 72 ms    Q-T Interval 386 ms    QTC Calculation(Bazett) 461 ms    P Axis 62 degrees    R Axis 58 degrees    T Axis 69 degrees    Diagnosis Line Normal sinus rhythm  Normal ECG    < end of copied text >      I&O's Detail      LABS:                        14.1   9.22  )-----------( 353      ( 23 Jul 2021 04:29 )             42.3     07-23    139  |  105  |  13  ----------------------------<  75  4.7   |  26  |  0.7    Ca    9.0      23 Jul 2021 04:29    TPro  6.5  /  Alb  3.9  /  TBili  0.4  /  DBili  x   /  AST  13  /  ALT  10  /  AlkPhos  69  07-23            BNP      I&O's Detail    Daily Height in cm: 160.02 (23 Jul 2021 10:15)    Daily     RADIOLOGY & ADDITIONAL STUDIES: Patient is a 35y old  Female who presents with a chief complaint of vertigo, facial numbness (23 Jul 2021 13:58)    HPI:  34 y/o F with PMH of current tobacco use (1ppd), who presented to ED c/o dizziness, numbness of face and difficulty swallowing 30 min prior to arrival to ED. She was sitting with her friends for breakfast, when she suddenly felt that the room is spinning. she immediately lie down then she started to experience numbness of her face and she was having trouble swallowing. She never had similar symptom in the past. She used to have dizziness few years ago, but at that time it was only when she changed position. She denies any headache, blurry vision, slurring of speech, weakness, tingling or numbness on any other part of body. She has recently been treated with antibiotics for UTI. No h/O stroke in the past. She is not taking any medication now , not on oral contraceptive. She denies any H/O miscarriage, no H/O DVT/PE. Denies any fever, cough, shortness of breath or any other constitutional symptoms. In ED , stroke call was called, her CTH  showed no acute pathology, CTA no large vessel occlusion. Her symptoms almost resolved at that time, NIH:1 for sensory impairment. After discussion with patient, TPA was not given as her symptoms were mild, nondisabling and resolving. She was kept under observation in ED overnight for further workup. Her MRI of brain revealed acute infarct of right inferior cerebellar hemisphere. She is being admitted in stroke unit for further evaluation and management.  (22 Jul 2021 10:52)      EP consulted for MCOT placement.     REVIEW OF SYSTEMS    [x] A ten-point review of systems was otherwise negative except as noted.  [ ] Due to altered mental status/intubation, subjective information were not able to be obtained from the patient. History was obtained, to the extent possible, from review of the chart and collateral sources of information.      PAST MEDICAL & SURGICAL HISTORY:  No pertinent past medical history    No significant past surgical history        Home Medications:      Allergies:    No Known Allergies      PREVIOUS DIAGNOSTIC TESTING:      ECHO  FINDINGS: < from: Transesophageal Echocardiogram (07.23.21 @ 15:33) >  Summary:   1. Left ventricular ejection fraction, by visual estimation, is 55 to 60%.   2. Normal global left ventricular systolic function.   3. Mild mitral valve regurgitation.   4. Mild tricuspid regurgitation.   5. Normal left atrial size.   6. No left atrial appendage thrombus and normal left atrial appendage velocities.   7. Color flow doppler and intravenous injection of agitated saline demonstrates the presence of an intact intra atrial septum.   8. Normal right atrial size.   9. Simple atheroma seen in the aortic arch.    < end of copied text >      FAMILY HISTORY:  No pertinent family history in first degree relatives        SOCIAL HISTORY:  CIGARETTES: current smoker, 1PPD  ALCOHOL: occasional ETOH       MEDICATIONS  (STANDING):  aspirin enteric coated 81 milliGRAM(s) Oral daily  atorvastatin 80 milliGRAM(s) Oral at bedtime  clopidogrel Tablet 75 milliGRAM(s) Oral daily  nicotine - 21 mG/24Hr(s) Patch 1 Patch Transdermal daily    MEDICATIONS  (PRN):      Vital Signs Last 24 Hrs  T(C): 36.8 (22 Jul 2021 20:27), Max: 36.8 (22 Jul 2021 20:27)  T(F): 98.2 (22 Jul 2021 20:27), Max: 98.2 (22 Jul 2021 20:27)  HR: 80 (23 Jul 2021 13:30) (72 - 82)  BP: 115/72 (23 Jul 2021 13:30) (115/72 - 124/60)  BP(mean): 95 (23 Jul 2021 13:30) (95 - 95)  RR: 18 (23 Jul 2021 13:30) (18 - 18)  SpO2: 100% (23 Jul 2021 10:14) (99% - 100%)    PHYSICAL EXAM:    GENERAL: Well appearing female,  In no apparent distress, well nourished, and hydrated.  HEART: Regular rate and rhythm; No murmurs, rubs, or gallops.  PULMONARY: Clear to auscultation and perfusion.  No rales, wheezing, or rhonchi bilaterally.  ABDOMEN: Soft, Nontender, Nondistended; Bowel sounds present  EXTREMITIES:  2+ Peripheral Pulses, No clubbing, cyanosis, or edema  NEUROLOGICAL: AO x4, WOOD, speech clear      INTERPRETATION OF TELEMETRY: SR 70 BPM    ECG: < from: 12 Lead ECG (07.21.21 @ 09:50) >  Ventricular Rate 86 BPM    Atrial Rate 86 BPM    P-R Interval 116 ms    QRS Duration 72 ms    Q-T Interval 386 ms    QTC Calculation(Bazett) 461 ms    P Axis 62 degrees    R Axis 58 degrees    T Axis 69 degrees    Diagnosis Line Normal sinus rhythm  Normal ECG    < end of copied text >      I&O's Detail      LABS:                        14.1   9.22  )-----------( 353      ( 23 Jul 2021 04:29 )             42.3     07-23    139  |  105  |  13  ----------------------------<  75  4.7   |  26  |  0.7    Ca    9.0      23 Jul 2021 04:29    TPro  6.5  /  Alb  3.9  /  TBili  0.4  /  DBili  x   /  AST  13  /  ALT  10  /  AlkPhos  69  07-23      BNP      I&O's Detail    Daily Height in cm: 160.02 (23 Jul 2021 10:15)    Daily     RADIOLOGY & ADDITIONAL STUDIES:

## 2021-07-23 NOTE — DISCHARGE NOTE PROVIDER - NSDCCPCAREPLAN_GEN_ALL_CORE_FT
PRINCIPAL DISCHARGE DIAGNOSIS  Diagnosis: Ischemic stroke  Assessment and Plan of Treatment: Acute infarct of right inferior cerebellar hemisphere  - FU on work up for hypercoagulablity   - FU on EDA   - Follow up with Dr. Bryant next week  - Take Statins and DAPT ( 3 weeks then continue aspirin only)   - Attempt smoking cessation

## 2021-07-23 NOTE — DISCHARGE NOTE PROVIDER - CARE PROVIDERS DIRECT ADDRESSES
,DirectAddress_Unknown ,DirectAddress_Unknown,[email or web address]

## 2021-07-23 NOTE — DISCHARGE NOTE PROVIDER - HOSPITAL COURSE
36 y/o F with PMH of current tobacco use (1ppd), who presented to ED c/o dizziness, numbness of face and difficulty swallowing 30 min prior to arrival to ED. She was sitting with her friends for breakfast, when she suddenly felt that the room is spinning. she immediately lie down then she started to experience numbness of her face and she was having trouble swallowing. She never had similar symptom in the past. She used to have dizziness few years ago, but at that time it was only when she changed position. She denies any headache, blurry vision, slurring of speech, weakness, tingling or numbness on any other part of body. She has recently been treated with antibiotics for UTI. No h/O stroke in the past. She is not taking any medication now , not on oral contraceptive. She denies any H/O miscarriage, no H/O DVT/PE. Denies any fever, cough, shortness of breath or any other constitutional symptoms. In ED , stroke call was called, her CTH  showed no acute pathology, CTA no large vessel occlusion. Her symptoms almost resolved at that time, NIH:1 for sensory impairment. After discussion with patient, TPA was not given as her symptoms were mild, nondisabling and resolving. She was kept under observation in ED overnight for further workup. Her MRI of brain revealed acute infarct of right inferior cerebellar hemisphere. Patient is back to baseline, all symptoms resolved, and can be discharged with follow up in outside clinic. 36 y/o F with PMH of current tobacco use (1ppd), who presented to ED c/o dizziness, numbness of face and difficulty swallowing 30 min prior to arrival to ED. She was sitting with her friends for breakfast, when she suddenly felt that the room is spinning. she immediately lie down then she started to experience numbness of her face and she was having trouble swallowing. She never had similar symptom in the past. She used to have dizziness few years ago, but at that time it was only when she changed position. She denies any headache, blurry vision, slurring of speech, weakness, tingling or numbness on any other part of body. She has recently been treated with antibiotics for UTI. No h/O stroke in the past. She is not taking any medication now , not on oral contraceptive. She denies any H/O miscarriage, no H/O DVT/PE. Denies any fever, cough, shortness of breath or any other constitutional symptoms. In ED , stroke call was called, her CTH  showed no acute pathology, CTA no large vessel occlusion. Her symptoms almost resolved at that time, NIH:1 for sensory impairment. After discussion with patient, TPA was not given as her symptoms were mild, nondisabling and resolving. She was kept under observation in ED overnight for further workup. Her MRI of brain revealed acute infarct of right inferior cerebellar hemisphere. Patient is back to baseline, all symptoms resolved, and can be discharged with follow up in outside clinic.     Attending Attestation:  I have personally seen and examined the patient. I have reviewed all pertinent history, labs, meds, vitals and imaging.     Pt is a 36 yo F with PMHx of tobacco use (1ppd), who presented with transient vertigo, blurred vision and right facial paresthesia. pt was deemed not an IV thrombolytic candidate due to mild, nondisabling symptoms on presentation. Pt is doing well today, no recurrence of symptoms. Mild right pronator drift on exam, otherwise nonfocal, ambulating without difficulty. MRI brain shows a small acute ischemic involving the right cerebellum, R PICA distribution. Uncertain etiology of stroke. Only risk factor is tobacco use. No hormonal medications, h/o DVT/PE, clotting disorder or family h/o stroke at young age. Posterior circulation strokes in young patients are most commonly related to VA dissection, however do not appreciate any changes on CTA concerning for dissection. 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 EDA obtained today, no potential source of clot or paradoxical emboli. Hypercoag panel, autoimmune workup including anti-phospholipid antibodies pending, will follow up outpatient. Continue DAPT for now, plan for 21 days then ASA monotherapy. If  aPL-positive, will likely refer to Heme/Onc and consider switching from ATP to AC for secondary stroke prevention. Event monitor on discharge. Follow up in neurology clinic in 2 weeks.

## 2021-07-24 LAB
B2 GLYCOPROT1 AB SER QL: NEGATIVE — SIGNIFICANT CHANGE UP
CARDIOLIPIN AB SER-ACNC: NEGATIVE — SIGNIFICANT CHANGE UP
CARDIOLIPIN AB SER-ACNC: NEGATIVE — SIGNIFICANT CHANGE UP
COVID-19 SPIKE DOMAIN AB INTERP: POSITIVE
COVID-19 SPIKE DOMAIN ANTIBODY RESULT: >250 U/ML — HIGH
SARS-COV-2 IGG+IGM SERPL QL IA: >250 U/ML — HIGH
SARS-COV-2 IGG+IGM SERPL QL IA: POSITIVE

## 2021-07-25 LAB
ANA TITR SER: NEGATIVE — SIGNIFICANT CHANGE UP
PROT S FREE PPP-ACNC: 116 % — SIGNIFICANT CHANGE UP (ref 63–140)

## 2021-07-26 LAB
AUTO DIFF PNL BLD: NEGATIVE — SIGNIFICANT CHANGE UP
C-ANCA SER-ACNC: NEGATIVE — SIGNIFICANT CHANGE UP
P-ANCA SER-ACNC: NEGATIVE — SIGNIFICANT CHANGE UP

## 2021-07-27 ENCOUNTER — TRANSCRIPTION ENCOUNTER (OUTPATIENT)
Age: 36
End: 2021-07-27

## 2021-07-28 LAB
DRVVT SCREEN TO CONFIRM RATIO: SIGNIFICANT CHANGE UP
LA NT DPL PPP QL: SIGNIFICANT CHANGE UP
NORMALIZED SCT PPP-RTO: 0.96 RATIO — SIGNIFICANT CHANGE UP (ref 0–1.16)
NORMALIZED SCT PPP-RTO: SIGNIFICANT CHANGE UP
PROT C ACT/NOR PPP: 135 % — HIGH (ref 65–129)

## 2021-07-29 DIAGNOSIS — F17.210 NICOTINE DEPENDENCE, CIGARETTES, UNCOMPLICATED: ICD-10-CM

## 2021-07-29 DIAGNOSIS — R20.2 PARESTHESIA OF SKIN: ICD-10-CM

## 2021-07-29 DIAGNOSIS — R42 DIZZINESS AND GIDDINESS: ICD-10-CM

## 2021-07-29 DIAGNOSIS — I63.541 CEREBRAL INFARCTION DUE TO UNSPECIFIED OCCLUSION OR STENOSIS OF RIGHT CEREBELLAR ARTERY: ICD-10-CM

## 2021-07-29 PROBLEM — Z00.00 ENCOUNTER FOR PREVENTIVE HEALTH EXAMINATION: Status: ACTIVE | Noted: 2021-07-29

## 2021-08-06 ENCOUNTER — LABORATORY RESULT (OUTPATIENT)
Age: 36
End: 2021-08-06

## 2021-08-06 ENCOUNTER — APPOINTMENT (OUTPATIENT)
Dept: NEUROLOGY | Facility: CLINIC | Age: 36
End: 2021-08-06
Payer: OTHER GOVERNMENT

## 2021-08-06 VITALS
SYSTOLIC BLOOD PRESSURE: 130 MMHG | DIASTOLIC BLOOD PRESSURE: 75 MMHG | BODY MASS INDEX: 22.15 KG/M2 | HEIGHT: 63 IN | OXYGEN SATURATION: 99 % | TEMPERATURE: 97 F | HEART RATE: 92 BPM | WEIGHT: 125 LBS

## 2021-08-06 DIAGNOSIS — N39.0 URINARY TRACT INFECTION, SITE NOT SPECIFIED: ICD-10-CM

## 2021-08-06 DIAGNOSIS — I77.74 DISSECTION OF VERTEBRAL ARTERY: ICD-10-CM

## 2021-08-06 DIAGNOSIS — I63.211 CEREBRAL INFARCTION DUE TO UNSPECIFIED OCCLUSION OR STENOSIS OF RIGHT VERTEBRAL ARTERY: ICD-10-CM

## 2021-08-06 DIAGNOSIS — Z87.442 PERSONAL HISTORY OF URINARY CALCULI: ICD-10-CM

## 2021-08-06 PROCEDURE — 99214 OFFICE O/P EST MOD 30 MIN: CPT

## 2021-08-06 RX ORDER — CLOPIDOGREL 75 MG/1
TABLET, FILM COATED ORAL
Refills: 0 | Status: ACTIVE | COMMUNITY

## 2021-08-06 RX ORDER — ASPIRIN 325 MG
TABLET ORAL
Refills: 0 | Status: ACTIVE | COMMUNITY

## 2021-08-06 RX ORDER — ATORVASTATIN CALCIUM 80 MG/1
TABLET, FILM COATED ORAL
Refills: 0 | Status: ACTIVE | COMMUNITY

## 2021-08-06 NOTE — HISTORY OF PRESENT ILLNESS
[FreeTextEntry1] : Patient is a 34 y/o woman with PMHx of kidney stones, UTI, active smoker who presents as HFU for focal acute infarcts of Right inferior cerebellar hemisphere in R PICA distribution and ?R distal V2 segment focal dissection vs thrombus during ED admission 7/21/22.   \par \par She initially presented to the ED on 7/21/21 w/ c/o of acute onset right facial numbness, pressure in right eye/blurry vision, and difficulty swallowing. She also reported acute transient vertigo. Stroke code called, NIHSS 1 for mild sensory loss. CTH was negative for acute pathology. CTA H/N reported focal high-grade narrowing involving the distal V2 segment of the right vertebral artery possibly representing a focal dissection versus thrombus, series 3 images 147-150. MRI head reported focal acute infarcts involving the right inferior cerebellar hemisphere (R PICA distribution). A single nonspecific focus of signal abnormality is noted within the right frontal subcortical region. EDA was negative for PFO or thrombus. She denies any H/O miscarriage, no H/O DVT/PE. Mild right pronator drift was found at discharge, but no focal deficits. She was seen by Dr. Bryant who thought she may have livedo racemosa vs levido reticularis. Partial hypercoag/Rheum panel was negative.  and A1c not on file. She was discharged on ASA 81, Atorvastatin 80 and Plavix 75. \par \par Today, patient presents to clinic and states that she had right neck pain radiating to right shoulder in early July. The day before the stroke she hyperextended her neck and had bought of vomiting due to the kidney stones. Not on OCPs. She feels 80% back to normal, with pins and needles to right face and b/l arms. 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 She had 30 day event monitor, but did not finish it bc it broke. \par \par

## 2021-08-06 NOTE — DISCUSSION/SUMMARY
[FreeTextEntry1] : Patient is a 34 y/o woman with PMHx of kidney stones, UTI, active smoker who presents as HFU for focal acute infarcts of Right inferior cerebellar hemisphere in R PICA distribution and ?R distal V2 segment focal dissection vs thrombus during ED admission 7/21/22. EDA was negative for PFO or thrombus. She denies any H/O miscarriage, no H/O DVT/PE. Spoken with Dr. Bryant. She recommends to do 3 months of DAPT until repeat vascular imaging. Today, exam is benign with no focal deficits.  and A1c not on file. She denies any spontaneous bleeding or bruising. \par  \par PLAN: \par -Run rest of hypercoag/rheum panel \par -C/w Atorvastatin 80 and repeat lipid panel next visit \par -C/w DAPT x90 days until repeat vascular imaging per Dr. Bryant\par -Follow up with cardiology for 30 day event monitoring  \par -F/u with Dr. Roche for VA dissection vs thrombus \par -Counseling on smoking cessation \par -Follow up w Dr. Bryant, already scheduled for 8/18

## 2021-08-06 NOTE — PHYSICAL EXAM
[FreeTextEntry1] : Focal neurological exam:\par \par MS: Awake, alert, oriented to person, place, situation and time. Normal affect. Follows commands. \par \par Language: Speech is clear, fluent with good repetition & comprehension. No dysarthria. \par \par CNs 2 - 12 intact. EOMI no nystagmus, no diplopia. VFF. No facial asymmetry b/l, full eye closure strength b/l. Hearing grossly normal. Head turning & shoulder shrug intact b/l. Tongue midline, normal movements, no atrophy.\par \par Motor: Normal muscle bulk & tone. No noticeable tremor or seizure. No pronator drift. Muscle strength of b/l UE and b/l LE 5/5. \par \par Reflexes: DTR of biceps 1+, knees 1+  \par \par Sensation: Intact to LT, temperature and vibration b/l throughout\par \par Cortical: No extinction\par \par Coordination: No dysmetria to FTN.\par \par Gait: No postural instability. Normal stance and tandem gait.\par \par NIHSS 0\par \par \par

## 2021-08-06 NOTE — REASON FOR VISIT
[Follow-Up: _____] : a [unfilled] follow-up visit [FreeTextEntry1] : focal acute infarcts of Right inferior cerebellar hemisphere in R PICA distribution and ?R distal V2 segment focal dissection vs thrombus during ED admission 7/21/22.

## 2021-08-09 LAB
ACE BLD-CCNC: 46 U/L
C3 SERPL-MCNC: 111 MG/DL
C4 SERPL-MCNC: 30 MG/DL
CARDIOLIPIN AB SER IA-ACNC: NEGATIVE
CRP SERPL-MCNC: <3 MG/L
ENA SS-A AB SER IA-ACNC: <0.2 AL
ENA SS-B AB SER IA-ACNC: <0.2 AL
ERYTHROCYTE [SEDIMENTATION RATE] IN BLOOD BY WESTERGREN METHOD: 7 MM/HR
HCYS SERPL-MCNC: 8.8 UMOL/L
MPO AB + PR3 PNL SER: NORMAL
PROT S AG ACT/NOR PPP IA: 84 %
PROT S FREE PPP-ACNC: 98 %
RHEUMATOID FACT SER QL: <10 IU/ML

## 2021-08-12 LAB
ANA SER IF-ACNC: NEGATIVE
ANACR T: NEGATIVE
B2 GLYCOPROT1 IGA SERPL IA-ACNC: <5 SAU
B2 GLYCOPROT1 IGG SER-ACNC: <5 SGU
B2 GLYCOPROT1 IGM SER-ACNC: <5 SMU
CARDIOLIPIN IGM SER-MCNC: 14 MPL
CARDIOLIPIN IGM SER-MCNC: <5 GPL
CCP AB SER IA-ACNC: <8 UNITS
DEPRECATED CARDIOLIPIN IGA SER: <5 APL
DNA PLOIDY SPEC FC-IMP: NORMAL
HLA-B27 RELATED AG QL: NEGATIVE
RF+CCP IGG SER-IMP: NEGATIVE

## 2021-08-13 LAB
CONFIRM: NORMAL
DRVVT IMM 1:2 NP PPP: NORMAL
DRVVT SCREEN TO CONFIRM RATIO: 0.88 RATIO
PROT C PPP CHRO-ACNC: 114 %
SCREEN DRVVT: NORMAL
SILICA CLOTTING TIME INTERPRETATION: NORMAL
SILICA CLOTTING TIME S/C: 0.92 RATIO

## 2021-08-17 RX ORDER — CLOPIDOGREL BISULFATE 75 MG/1
75 TABLET, FILM COATED ORAL DAILY
Qty: 30 | Refills: 2 | Status: ACTIVE | COMMUNITY
Start: 2021-07-29 | End: 1900-01-01

## 2021-08-18 LAB
FACT IX ACT/NOR PPP: 128 %
FACT VIII ACT/NOR PPP: 92 %

## 2021-08-28 ENCOUNTER — EMERGENCY (EMERGENCY)
Facility: HOSPITAL | Age: 36
LOS: 0 days | Discharge: HOME | End: 2021-08-28
Attending: STUDENT IN AN ORGANIZED HEALTH CARE EDUCATION/TRAINING PROGRAM | Admitting: STUDENT IN AN ORGANIZED HEALTH CARE EDUCATION/TRAINING PROGRAM
Payer: OTHER GOVERNMENT

## 2021-08-28 VITALS
DIASTOLIC BLOOD PRESSURE: 58 MMHG | HEART RATE: 65 BPM | RESPIRATION RATE: 16 BRPM | OXYGEN SATURATION: 99 % | TEMPERATURE: 97 F | SYSTOLIC BLOOD PRESSURE: 121 MMHG

## 2021-08-28 VITALS
DIASTOLIC BLOOD PRESSURE: 78 MMHG | RESPIRATION RATE: 18 BRPM | OXYGEN SATURATION: 99 % | HEIGHT: 63 IN | HEART RATE: 78 BPM | TEMPERATURE: 98 F | WEIGHT: 125 LBS | SYSTOLIC BLOOD PRESSURE: 132 MMHG

## 2021-08-28 DIAGNOSIS — R11.0 NAUSEA: ICD-10-CM

## 2021-08-28 DIAGNOSIS — Z79.01 LONG TERM (CURRENT) USE OF ANTICOAGULANTS: ICD-10-CM

## 2021-08-28 DIAGNOSIS — R42 DIZZINESS AND GIDDINESS: ICD-10-CM

## 2021-08-28 DIAGNOSIS — F17.200 NICOTINE DEPENDENCE, UNSPECIFIED, UNCOMPLICATED: ICD-10-CM

## 2021-08-28 DIAGNOSIS — Z79.82 LONG TERM (CURRENT) USE OF ASPIRIN: ICD-10-CM

## 2021-08-28 DIAGNOSIS — Z86.73 PERSONAL HISTORY OF TRANSIENT ISCHEMIC ATTACK (TIA), AND CEREBRAL INFARCTION WITHOUT RESIDUAL DEFICITS: ICD-10-CM

## 2021-08-28 LAB
ALBUMIN SERPL ELPH-MCNC: 4.7 G/DL — SIGNIFICANT CHANGE UP (ref 3.5–5.2)
ALP SERPL-CCNC: 61 U/L — SIGNIFICANT CHANGE UP (ref 30–115)
ALT FLD-CCNC: 9 U/L — SIGNIFICANT CHANGE UP (ref 0–41)
ANION GAP SERPL CALC-SCNC: 13 MMOL/L — SIGNIFICANT CHANGE UP (ref 7–14)
AST SERPL-CCNC: 13 U/L — SIGNIFICANT CHANGE UP (ref 0–41)
BASOPHILS # BLD AUTO: 0.07 K/UL — SIGNIFICANT CHANGE UP (ref 0–0.2)
BASOPHILS NFR BLD AUTO: 0.7 % — SIGNIFICANT CHANGE UP (ref 0–1)
BILIRUB SERPL-MCNC: 0.4 MG/DL — SIGNIFICANT CHANGE UP (ref 0.2–1.2)
BUN SERPL-MCNC: 14 MG/DL — SIGNIFICANT CHANGE UP (ref 10–20)
CALCIUM SERPL-MCNC: 10 MG/DL — SIGNIFICANT CHANGE UP (ref 8.5–10.1)
CHLORIDE SERPL-SCNC: 104 MMOL/L — SIGNIFICANT CHANGE UP (ref 98–110)
CO2 SERPL-SCNC: 21 MMOL/L — SIGNIFICANT CHANGE UP (ref 17–32)
CREAT SERPL-MCNC: 0.8 MG/DL — SIGNIFICANT CHANGE UP (ref 0.7–1.5)
EOSINOPHIL # BLD AUTO: 0.07 K/UL — SIGNIFICANT CHANGE UP (ref 0–0.7)
EOSINOPHIL NFR BLD AUTO: 0.7 % — SIGNIFICANT CHANGE UP (ref 0–8)
GLUCOSE SERPL-MCNC: 93 MG/DL — SIGNIFICANT CHANGE UP (ref 70–99)
HCG SERPL QL: NEGATIVE — SIGNIFICANT CHANGE UP
HCT VFR BLD CALC: 44.2 % — SIGNIFICANT CHANGE UP (ref 37–47)
HGB BLD-MCNC: 14.9 G/DL — SIGNIFICANT CHANGE UP (ref 12–16)
IMM GRANULOCYTES NFR BLD AUTO: 0.3 % — SIGNIFICANT CHANGE UP (ref 0.1–0.3)
LYMPHOCYTES # BLD AUTO: 2.4 K/UL — SIGNIFICANT CHANGE UP (ref 1.2–3.4)
LYMPHOCYTES # BLD AUTO: 23.8 % — SIGNIFICANT CHANGE UP (ref 20.5–51.1)
MCHC RBC-ENTMCNC: 29.1 PG — SIGNIFICANT CHANGE UP (ref 27–31)
MCHC RBC-ENTMCNC: 33.7 G/DL — SIGNIFICANT CHANGE UP (ref 32–37)
MCV RBC AUTO: 86.3 FL — SIGNIFICANT CHANGE UP (ref 81–99)
MONOCYTES # BLD AUTO: 0.5 K/UL — SIGNIFICANT CHANGE UP (ref 0.1–0.6)
MONOCYTES NFR BLD AUTO: 5 % — SIGNIFICANT CHANGE UP (ref 1.7–9.3)
NEUTROPHILS # BLD AUTO: 7.03 K/UL — HIGH (ref 1.4–6.5)
NEUTROPHILS NFR BLD AUTO: 69.5 % — SIGNIFICANT CHANGE UP (ref 42.2–75.2)
NRBC # BLD: 0 /100 WBCS — SIGNIFICANT CHANGE UP (ref 0–0)
PLATELET # BLD AUTO: 251 K/UL — SIGNIFICANT CHANGE UP (ref 130–400)
POTASSIUM SERPL-MCNC: 4.1 MMOL/L — SIGNIFICANT CHANGE UP (ref 3.5–5)
POTASSIUM SERPL-SCNC: 4.1 MMOL/L — SIGNIFICANT CHANGE UP (ref 3.5–5)
PROT SERPL-MCNC: 7.2 G/DL — SIGNIFICANT CHANGE UP (ref 6–8)
RBC # BLD: 5.12 M/UL — SIGNIFICANT CHANGE UP (ref 4.2–5.4)
RBC # FLD: 13.2 % — SIGNIFICANT CHANGE UP (ref 11.5–14.5)
SODIUM SERPL-SCNC: 138 MMOL/L — SIGNIFICANT CHANGE UP (ref 135–146)
WBC # BLD: 10.1 K/UL — SIGNIFICANT CHANGE UP (ref 4.8–10.8)
WBC # FLD AUTO: 10.1 K/UL — SIGNIFICANT CHANGE UP (ref 4.8–10.8)

## 2021-08-28 PROCEDURE — 99284 EMERGENCY DEPT VISIT MOD MDM: CPT

## 2021-08-28 PROCEDURE — 70498 CT ANGIOGRAPHY NECK: CPT | Mod: 26,MA

## 2021-08-28 PROCEDURE — 70450 CT HEAD/BRAIN W/O DYE: CPT | Mod: 26,MA,59

## 2021-08-28 PROCEDURE — 70496 CT ANGIOGRAPHY HEAD: CPT | Mod: 26,MA

## 2021-08-28 PROCEDURE — 93010 ELECTROCARDIOGRAM REPORT: CPT

## 2021-08-28 RX ORDER — MECLIZINE HCL 12.5 MG
50 TABLET ORAL ONCE
Refills: 0 | Status: COMPLETED | OUTPATIENT
Start: 2021-08-28 | End: 2021-08-28

## 2021-08-28 RX ORDER — SODIUM CHLORIDE 9 MG/ML
1000 INJECTION, SOLUTION INTRAVENOUS ONCE
Refills: 0 | Status: COMPLETED | OUTPATIENT
Start: 2021-08-28 | End: 2021-08-28

## 2021-08-28 RX ADMIN — Medication 50 MILLIGRAM(S): at 09:42

## 2021-08-28 RX ADMIN — SODIUM CHLORIDE 1000 MILLILITER(S): 9 INJECTION, SOLUTION INTRAVENOUS at 09:35

## 2021-08-28 RX ADMIN — SODIUM CHLORIDE 1000 MILLILITER(S): 9 INJECTION, SOLUTION INTRAVENOUS at 11:00

## 2021-08-28 NOTE — ED PROVIDER NOTE - OBJECTIVE STATEMENT
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 mild vertigo since 3pm yesterday. worse w/ head movement, better/ resolves if head held still. + mild nausea. No ENT complaint, neuro deficit, trauma, HA. No palpitations or cp. Pt taking meds as rx'ed. 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 Mcmahon f/up, p/w acute onset. mild intermittent vertigo, (described as her body moving/ unsteady) since 3pm yesterday, after waking from nap. Worse w/ head movement jduy to R, better/ resolves if head held still. + mild nausea. No ENT complaint, neuro deficit, trauma, HA. No palpitations or cp. Pt taking meds as rx'ed.

## 2021-08-28 NOTE — ED PROVIDER NOTE - NSFOLLOWUPCLINICS_GEN_ALL_ED_FT
[facility]  Neurology  [address], [address]  [place] [address]  Phone: [phone]  Fax:   Follow Up Time: 1-3 Days    [facility]  ENT  [address], [address]nd [place] [address]  Phone: [phone]  Fax:   Follow Up Time: 1-3 Days

## 2021-08-28 NOTE — ED PROVIDER NOTE - NS ED ROS FT
Constitutional:  No fever  Eyes:  No visual changes  ENMT: No neck pain or stiffness  Cardiac:  No chest pain  Respiratory:  No cough or respiratory distress.   GI:  No nausea, vomiting, diarrhea or abdominal pain.  :  No dysuria, frequency or burning  GYN: LMP 8/3  MS:  No back pain.  Neuro:  No headache, see HPI   Skin:  No skin rash  Except as documented in the HPI,  all other systems are negative

## 2021-08-28 NOTE — ED PROVIDER NOTE - PHYSICAL EXAMINATION
CONSTITUTIONAL: NAD  SKIN: Warm dry  HEAD: NCAT  EYES: NL inspection  ENT: MMM  NECK: Supple; non tender.  CARD: RRR  RESP: CTAB  ABD: S/NT no R/G  EXT: no pedal edema  NEURO: aox4; NL motor and sensory throughout; NL CN; no cerebellar signs; NL gait; feels some w/ position change that resolves when head held still.  PSYCH: Cooperative, appropriate. CONSTITUTIONAL: NAD  SKIN: Warm dry  HEAD: NCAT  EYES: NL inspection  ENT: MMM  NECK: Supple; non tender.  CARD: RRR  RESP: CTAB  ABD: S/NT no R/G  EXT: no pedal edema  NEURO: aox4; NL motor and sensory throughout; NL CN; no cerebellar signs; no skew deviation. No direction changing nystagmus. NL gait; feels some vertigo w/ position change that resolves when head held still.  PSYCH: Cooperative, appropriate.

## 2021-08-28 NOTE — ED PROVIDER NOTE - PATIENT PORTAL LINK FT
You can access the FollowMyHealth Patient Portal offered by [facility] by registering at the following website: [email or web address]. By joining [employer]’s FollowMyHealth portal, you will also be able to view your health information using other applications (apps) compatible with our system.

## 2021-08-28 NOTE — ED ADULT NURSE NOTE - CHIEF COMPLAINT QUOTE
Noted. There are no earlier appts as of now on [facility] schedule for 8/13. pt c/o dizziness for 2 days

## 2021-08-28 NOTE — ED PROVIDER NOTE - CLINICAL SUMMARY MEDICAL DECISION MAKING FREE TEXT BOX
Pt w/ dizzines and hx cerebellar infarcts. All sx resolved in ED. Pt walking, spinning, bending over, no sx. CT imaging neg for acute injury. Discussed all available results with Pt.  Pt understands results, plan of care, outpt follow up as discussed, and signs and symptoms for ED return.  Pt is comfortable with discharge. DC home.

## 2021-08-28 NOTE — ED ADULT NURSE NOTE - INTERVENTIONS DEFINITIONS
[place] to call system/Call bell, personal items and telephone within reach/Instruct patient to call for assistance/Room bathroom lighting operational/Non-slip footwear when patient is off stretcher/Physically safe environment: no spills, clutter or unnecessary equipment/Stretcher in lowest position, wheels locked, appropriate side rails in place/Provide visual cue, wrist band, yellow gown, etc./Monitor gait and stability/Monitor for mental status changes and reorient to person, place, and time/Review medications for side effects contributing to fall risk/Reinforce activity limits and safety measures with patient and family/Provide visual clues: red socks

## 2021-08-31 ENCOUNTER — APPOINTMENT (OUTPATIENT)
Dept: NEUROLOGY | Facility: CLINIC | Age: 36
End: 2021-08-31

## 2021-09-08 ENCOUNTER — APPOINTMENT (OUTPATIENT)
Dept: CARDIOLOGY | Facility: CLINIC | Age: 36
End: 2021-09-08

## 2022-01-04 ENCOUNTER — APPOINTMENT (OUTPATIENT)
Dept: NEUROLOGY | Facility: CLINIC | Age: 37
End: 2022-01-04

## 2022-03-02 NOTE — PHYSICAL THERAPY INITIAL EVALUATION ADULT - PROPRIOCEPTION, LLE, OT EVAL
Constitutional: No fever or chills  Eyes: No visual changes, eye pain or redness  HEENT: No throat pain, ear pain, nasal pain. No nose bleeding.  CV: No chest pain or lower extremity edema  Resp: No SOB no cough  GI: No abd pain. No nausea or vomiting. No diarrhea. No constipation.   : No dysuria, hematuria.   MSK: ++musculoskeletal pain  Skin: No rash +bruising   Neuro: ++headache. No numbness or tingling. No weakness. within normal limits

## 2022-06-23 ENCOUNTER — EMERGENCY (EMERGENCY)
Facility: HOSPITAL | Age: 37
LOS: 0 days | Discharge: HOME | End: 2022-06-23
Attending: STUDENT IN AN ORGANIZED HEALTH CARE EDUCATION/TRAINING PROGRAM | Admitting: STUDENT IN AN ORGANIZED HEALTH CARE EDUCATION/TRAINING PROGRAM

## 2022-06-23 VITALS
SYSTOLIC BLOOD PRESSURE: 155 MMHG | HEART RATE: 115 BPM | HEIGHT: 63 IN | WEIGHT: 149.91 LBS | RESPIRATION RATE: 17 BRPM | TEMPERATURE: 100 F | OXYGEN SATURATION: 99 % | DIASTOLIC BLOOD PRESSURE: 87 MMHG

## 2022-06-23 VITALS — HEART RATE: 76 BPM | TEMPERATURE: 98 F

## 2022-06-23 DIAGNOSIS — Z23 ENCOUNTER FOR IMMUNIZATION: ICD-10-CM

## 2022-06-23 DIAGNOSIS — Z20.3 CONTACT WITH AND (SUSPECTED) EXPOSURE TO RABIES: ICD-10-CM

## 2022-06-23 DIAGNOSIS — Z86.73 PERSONAL HISTORY OF TRANSIENT ISCHEMIC ATTACK (TIA), AND CEREBRAL INFARCTION WITHOUT RESIDUAL DEFICITS: ICD-10-CM

## 2022-06-23 DIAGNOSIS — F17.200 NICOTINE DEPENDENCE, UNSPECIFIED, UNCOMPLICATED: ICD-10-CM

## 2022-06-23 PROCEDURE — 99283 EMERGENCY DEPT VISIT LOW MDM: CPT

## 2022-06-23 RX ORDER — RABIES VACC, HUMAN DIPLOID/PF 2.5 UNIT
1 VIAL (EA) INTRAMUSCULAR ONCE
Refills: 0 | Status: COMPLETED | OUTPATIENT
Start: 2022-06-23 | End: 2022-06-23

## 2022-06-23 RX ORDER — HUMAN RABIES VIRUS IMMUNE GLOBULIN 150 [IU]/ML
1350 INJECTION, SOLUTION INTRAMUSCULAR ONCE
Refills: 0 | Status: COMPLETED | OUTPATIENT
Start: 2022-06-23 | End: 2022-06-23

## 2022-06-23 RX ADMIN — HUMAN RABIES VIRUS IMMUNE GLOBULIN 1350 UNIT(S): 150 INJECTION, SOLUTION INTRAMUSCULAR at 21:12

## 2022-06-23 RX ADMIN — Medication 1 MILLILITER(S): at 20:37

## 2022-06-23 NOTE — ED PROVIDER NOTE - OBJECTIVE STATEMENT
35 y/o female with a PMH of CVA s/p carotid dissection presents to the ED for evaluation of exposure to bat today around 1pm. pt reports she was in her kitchen aroudn 1pm and a bat started flying around her head and hit her in the head causing her bun to fall out (pt has had hair tied up in a messy bun hair do). pt is unsure if she was bit by the bat. pt reports the bat has been removed from her house. Pt denies fever, chills, bodyaches, muscle spasms or stiffness, abdominal pain, chest pain, sob, or n/v/d/c.

## 2022-06-23 NOTE — ED ADULT NURSE NOTE - OBJECTIVE STATEMENT
Pt states there was a bat in her house and she was bite by it while making coffee. pt states "I was making coffee in my kitchen, and a bat came in and got stuck in my hair, I am not sure if it bit me"

## 2022-06-23 NOTE — ED PROVIDER NOTE - PHYSICAL EXAMINATION
Physical Exam    Vital Signs: I have reviewed the initial vital signs.  Constitutional: well-nourished, appears stated age, no acute distress  Eyes: Conjunctiva pink, Sclera clear, PERRLA, EOMI  Cardiovascular: S1 and S2, regular rate, regular rhythm, well-perfused extremities, radial pulses equal and 2+ b/l.   Respiratory: unlabored respiratory effort, clear to auscultation bilaterally no wheezing, rales and rhonchi. pt is speaking full sentences. no accessory muscle use.   Musculoskeletal: FROM of b/l upper and lower extremities without pain.   Integumentary: warm, dry, no rash. No visible bite marks or puncture wounds on body or scalp.   Neurologic: awake, alert, cranial nerves II-XII grossly intact, extremities’ motor and sensory functions grossly intact. steady gait.   Psychiatric: appropriate mood, appropriate affect

## 2022-06-23 NOTE — ED PROVIDER NOTE - NSFOLLOWUPINSTRUCTIONS_ED_ALL_ED_FT
Rabies    WHAT YOU NEED TO KNOW:    Rabies is a disease that affects the body's central nervous system (brain, spinal cord, and nerves). Rabies is caused by a virus. You may get the virus if you come into contact with the saliva or other tissue of an infected animal. Rabies infection usually happens through a bite wound. Animals that may spread rabies include dogs, cats, coyotes, raccoons, foxes, skunks, and bats. Rabies develops when the virus enters the skin and goes to the muscles or nerves.          DISCHARGE INSTRUCTIONS:    Call 911 for any of the following:     You have trouble swallowing or slurred speech.      You have double vision, or you see things that are not really there.       You begin twitching, have muscle cramps, or have a seizure.     Return to the emergency department if:     You think you were exposed to rabies.       You were bitten by an animal.       You feel weak, tired, dizzy, confused, restless, or anxious.     Contact your healthcare provider if:     You have a fever.      Your signs and symptoms do not get better after treatment.       You have questions or concerns about rabies and rabies treatment.     Medicines:     Medicines such as the rabies vaccine or immune globulin may be given. These medicines help your body fight the virus and prevent rabies.      Take your medicine as directed. Contact your healthcare provider if you think your medicine is not helping or if you have side effects. Tell him or her if you are allergic to any medicine. Keep a list of the medicines, vitamins, and herbs you take. Include the amounts, and when and why you take them. Bring the list or the pill bottles to follow-up visits. Carry your medicine list with you in case of an emergency.    Follow up with your healthcare provider as directed: Write down your questions so you remember to ask them during your visits.    Prevent rabies:     Ask your healthcare provider about the rabies vaccine. You may need the vaccine if your work puts you at risk for rabies. You may also need the vaccine if you plan to travel to places where the risk for rabies is high. Ask for more information on rabies shots.      Avoid contact with animals. Do not approach any tame or wild animal that you do not know. Do not try to take them home with you. Cover windows and other openings in your home with screens so wild animals cannot get inside.      Get medical care if you get bitten by an animal. Do this even if the wound is very small.       Get your pet vaccinated against rabies. You will need to do this every 3 years or as directed by your .     What to do if an animal bites you: Clean the bite wound well and cover the wound with a clean bandage. Then contact your healthcare provider.       © Copyright [employer] 2019 All illustrations and images included in CareNotes are the copyrighted property of [employer] or [employer].            Rabies Vaccine    WHAT YOU NEED TO KNOW:    The rabies vaccine is an injection given to help prevent a rabies virus infection. The virus is spread to humans through the bite of an infected animal. Dogs, bats, skunks, coyotes, raccoons, and foxes are examples of animals that can carry rabies. The rabies vaccine can protect you from being infected with the virus. The vaccine can also prevent you from developing rabies even if you get it after you were bitten by an animal.     DISCHARGE INSTRUCTIONS:    Call 911 for any of the following:     Your mouth and throat are swollen.      You are wheezing or have trouble breathing.      You have chest pain or your heart is beating faster than normal for you.      You feel like you are going to faint.    Return to the emergency department if:     Your face is red or swollen.      You have hives that spread over your body.      You feel weak or dizzy.    Contact your healthcare provider if:     You have increased pain, redness, or swelling around the area where the shot was given.      You have questions or concerns about the rabies vaccine.    Apply a warm compress to the injection area as directed to decrease pain and swelling.    Follow up with your healthcare provider as directed: Write down your questions so you remember to ask them during your visits.        © Copyright [employer] 2019 All illustrations and images included in CareNotes are the copyrighted property of [employer] or [employer].

## 2022-06-23 NOTE — ED ADULT TRIAGE NOTE - HISTORY OF COVID-19 VACCINATION
How Severe Is It?: moderate
Is This A New Presentation, Or A Follow-Up?: Hair Loss
Additional History: Patient states pcp ran blood work including thyroid and hormones. All blood work was normal
Yes

## 2022-06-23 NOTE — ED PROVIDER NOTE - PATIENT PORTAL LINK FT
You can access the FollowMyHealth Patient Portal offered by [facility] by registering at the following website: [email or web address]. By joining [employer]’s FollowMyHealth portal, you will also be able to view your health information using other applications (apps) compatible with our system.

## 2022-06-23 NOTE — ED PROVIDER NOTE - CLINICAL SUMMARY MEDICAL DECISION MAKING FREE TEXT BOX
36F presents today after a brown bat was found in her bedroom flying above her head. Denies known bite. Her  also entered the house at later time. No other complaints. VS noted, triage note reviewed. Agree with exam as documented above. Rabies IVIG and vaccine administered. Patient was encouraged to have her  evaluated as well d/t possible exposure. Instructions given for return for repeat vaccinations.  Patient given return precautions, f/u instructions and verbalizes understanding.

## 2022-12-11 NOTE — REVIEW OF SYSTEMS
· Suspect this is 2/2 sedation vs infection, now improving   · Frequent neuro checks  · Maintain normothermia and normoglycemia  · Delirium precautions  · CAM ICU [As Noted in HPI] : as noted in HPI [Negative] : Respiratory

## 2023-06-05 NOTE — ED CDU PROVIDER INITIAL DAY NOTE - OBSERVATION MONITORING PLAN
Addended by: EFRAIN BROWN on: 5/31/2023 11:31 AM     Modules accepted: Orders    
Addended by: EFRAIN BROWN on: 6/5/2023 05:35 PM     Modules accepted: Orders    
Addended by: MAGNO TRENT on: 5/31/2023 01:17 PM     Modules accepted: Orders    
ED Record Reviewed

## 2023-06-27 NOTE — OCCUPATIONAL THERAPY INITIAL EVALUATION ADULT - STANDING BALANCE: DYNAMIC, REHAB EVAL
normal balance Pinch Graft Text: The defect edges were debeveled with a #15 scalpel blade. Given the location of the defect, shape of the defect and the proximity to free margins a pinch graft was deemed most appropriate. Using a sterile surgical marker, the primary defect shape was transferred to the donor site. The area thus outlined was incised deep to adipose tissue with a #15 scalpel blade.  The harvested graft was then trimmed of adipose tissue until only dermis and epidermis was left. The skin margins of the secondary defect were undermined to an appropriate distance in all directions utilizing iris scissors.  The secondary defect was closed with interrupted buried subcutaneous sutures.  The skin edges were then re-apposed with running  sutures.  The skin graft was then placed in the primary defect and oriented appropriately.

## 2023-07-11 ENCOUNTER — INPATIENT (INPATIENT)
Facility: HOSPITAL | Age: 38
LOS: 0 days | Discharge: HOME | DRG: 603 | End: 2023-07-12
Attending: INTERNAL MEDICINE | Admitting: INTERNAL MEDICINE
Payer: OTHER GOVERNMENT

## 2023-07-11 VITALS
OXYGEN SATURATION: 98 % | TEMPERATURE: 98 F | HEART RATE: 105 BPM | WEIGHT: 149.91 LBS | RESPIRATION RATE: 20 BRPM | DIASTOLIC BLOOD PRESSURE: 79 MMHG | SYSTOLIC BLOOD PRESSURE: 130 MMHG | HEIGHT: 63 IN

## 2023-07-11 DIAGNOSIS — L03.90 CELLULITIS, UNSPECIFIED: ICD-10-CM

## 2023-07-11 LAB
ALBUMIN SERPL ELPH-MCNC: 4.3 G/DL — SIGNIFICANT CHANGE UP (ref 3.5–5.2)
ALP SERPL-CCNC: 94 U/L — SIGNIFICANT CHANGE UP (ref 30–115)
ALT FLD-CCNC: 29 U/L — SIGNIFICANT CHANGE UP (ref 0–41)
ANION GAP SERPL CALC-SCNC: 12 MMOL/L — SIGNIFICANT CHANGE UP (ref 7–14)
AST SERPL-CCNC: 23 U/L — SIGNIFICANT CHANGE UP (ref 0–41)
BASOPHILS # BLD AUTO: 0.07 K/UL — SIGNIFICANT CHANGE UP (ref 0–0.2)
BASOPHILS NFR BLD AUTO: 0.8 % — SIGNIFICANT CHANGE UP (ref 0–1)
BILIRUB SERPL-MCNC: <0.2 MG/DL — SIGNIFICANT CHANGE UP (ref 0.2–1.2)
BUN SERPL-MCNC: 10 MG/DL — SIGNIFICANT CHANGE UP (ref 10–20)
CALCIUM SERPL-MCNC: 9.4 MG/DL — SIGNIFICANT CHANGE UP (ref 8.4–10.4)
CHLORIDE SERPL-SCNC: 105 MMOL/L — SIGNIFICANT CHANGE UP (ref 98–110)
CO2 SERPL-SCNC: 20 MMOL/L — SIGNIFICANT CHANGE UP (ref 17–32)
CREAT SERPL-MCNC: 0.9 MG/DL — SIGNIFICANT CHANGE UP (ref 0.7–1.5)
EGFR: 83 ML/MIN/1.73M2 — SIGNIFICANT CHANGE UP
EOSINOPHIL # BLD AUTO: 0.09 K/UL — SIGNIFICANT CHANGE UP (ref 0–0.7)
EOSINOPHIL NFR BLD AUTO: 1 % — SIGNIFICANT CHANGE UP (ref 0–8)
GLUCOSE SERPL-MCNC: 81 MG/DL — SIGNIFICANT CHANGE UP (ref 70–99)
HCT VFR BLD CALC: 46.8 % — SIGNIFICANT CHANGE UP (ref 37–47)
HGB BLD-MCNC: 15.9 G/DL — SIGNIFICANT CHANGE UP (ref 12–16)
IMM GRANULOCYTES NFR BLD AUTO: 0.3 % — SIGNIFICANT CHANGE UP (ref 0.1–0.3)
LYMPHOCYTES # BLD AUTO: 2.15 K/UL — SIGNIFICANT CHANGE UP (ref 1.2–3.4)
LYMPHOCYTES # BLD AUTO: 24.3 % — SIGNIFICANT CHANGE UP (ref 20.5–51.1)
MCHC RBC-ENTMCNC: 28.6 PG — SIGNIFICANT CHANGE UP (ref 27–31)
MCHC RBC-ENTMCNC: 34 G/DL — SIGNIFICANT CHANGE UP (ref 32–37)
MCV RBC AUTO: 84.3 FL — SIGNIFICANT CHANGE UP (ref 81–99)
MONOCYTES # BLD AUTO: 0.56 K/UL — SIGNIFICANT CHANGE UP (ref 0.1–0.6)
MONOCYTES NFR BLD AUTO: 6.3 % — SIGNIFICANT CHANGE UP (ref 1.7–9.3)
NEUTROPHILS # BLD AUTO: 5.95 K/UL — SIGNIFICANT CHANGE UP (ref 1.4–6.5)
NEUTROPHILS NFR BLD AUTO: 67.3 % — SIGNIFICANT CHANGE UP (ref 42.2–75.2)
NRBC # BLD: 0 /100 WBCS — SIGNIFICANT CHANGE UP (ref 0–0)
PLATELET # BLD AUTO: 266 K/UL — SIGNIFICANT CHANGE UP (ref 130–400)
PMV BLD: 10.5 FL — HIGH (ref 7.4–10.4)
POTASSIUM SERPL-MCNC: 4.5 MMOL/L — SIGNIFICANT CHANGE UP (ref 3.5–5)
POTASSIUM SERPL-SCNC: 4.5 MMOL/L — SIGNIFICANT CHANGE UP (ref 3.5–5)
PROT SERPL-MCNC: 7 G/DL — SIGNIFICANT CHANGE UP (ref 6–8)
RBC # BLD: 5.55 M/UL — HIGH (ref 4.2–5.4)
RBC # FLD: 13.5 % — SIGNIFICANT CHANGE UP (ref 11.5–14.5)
SODIUM SERPL-SCNC: 137 MMOL/L — SIGNIFICANT CHANGE UP (ref 135–146)
WBC # BLD: 8.85 K/UL — SIGNIFICANT CHANGE UP (ref 4.8–10.8)
WBC # FLD AUTO: 8.85 K/UL — SIGNIFICANT CHANGE UP (ref 4.8–10.8)

## 2023-07-11 PROCEDURE — 85025 COMPLETE CBC W/AUTO DIFF WBC: CPT

## 2023-07-11 PROCEDURE — 99285 EMERGENCY DEPT VISIT HI MDM: CPT

## 2023-07-11 PROCEDURE — 99223 1ST HOSP IP/OBS HIGH 75: CPT

## 2023-07-11 PROCEDURE — 36415 COLL VENOUS BLD VENIPUNCTURE: CPT

## 2023-07-11 PROCEDURE — 83036 HEMOGLOBIN GLYCOSYLATED A1C: CPT

## 2023-07-11 PROCEDURE — 86140 C-REACTIVE PROTEIN: CPT

## 2023-07-11 PROCEDURE — 80061 LIPID PANEL: CPT

## 2023-07-11 PROCEDURE — 85652 RBC SED RATE AUTOMATED: CPT

## 2023-07-11 PROCEDURE — 83735 ASSAY OF MAGNESIUM: CPT

## 2023-07-11 PROCEDURE — 80048 BASIC METABOLIC PNL TOTAL CA: CPT

## 2023-07-11 RX ORDER — CEFAZOLIN SODIUM 1 G
VIAL (EA) INJECTION
Refills: 0 | Status: DISCONTINUED | OUTPATIENT
Start: 2023-07-11 | End: 2023-07-12

## 2023-07-11 RX ORDER — CEFAZOLIN SODIUM 1 G
1000 VIAL (EA) INJECTION EVERY 8 HOURS
Refills: 0 | Status: DISCONTINUED | OUTPATIENT
Start: 2023-07-12 | End: 2023-07-12

## 2023-07-11 RX ORDER — VANCOMYCIN HCL 1 G
1000 VIAL (EA) INTRAVENOUS ONCE
Refills: 0 | Status: COMPLETED | OUTPATIENT
Start: 2023-07-11 | End: 2023-07-11

## 2023-07-11 RX ORDER — SODIUM CHLORIDE 9 MG/ML
1000 INJECTION, SOLUTION INTRAVENOUS ONCE
Refills: 0 | Status: COMPLETED | OUTPATIENT
Start: 2023-07-11 | End: 2023-07-11

## 2023-07-11 RX ORDER — CEFAZOLIN SODIUM 1 G
1000 VIAL (EA) INJECTION ONCE
Refills: 0 | Status: COMPLETED | OUTPATIENT
Start: 2023-07-11 | End: 2023-07-11

## 2023-07-11 RX ADMIN — SODIUM CHLORIDE 1000 MILLILITER(S): 9 INJECTION, SOLUTION INTRAVENOUS at 16:39

## 2023-07-11 RX ADMIN — Medication 100 MILLIGRAM(S): at 21:27

## 2023-07-11 RX ADMIN — Medication 250 MILLIGRAM(S): at 16:45

## 2023-07-11 NOTE — H&P ADULT - HISTORY OF PRESENT ILLNESS
37 year old female patient with no past medical history presented for thigh pain and redness. Symptoms started few days ago and patient went to urgent care where she was diagnosed with cellulitis and was started on Bactrim but the erythema was worsening. The patient marked the edges of the erythema and the it was extending beyond the markings so she came to the ED. Patient reported being exposed to spiders bites a week ago.    In the ED vitals and labs were within normal limits    Bedside US showed no abscess

## 2023-07-11 NOTE — H&P ADULT - ATTENDING COMMENTS
Patient seen and examined at bedside independently of the residents. I read the resident's note and agree with the plan with the additions and corrections as noted below. My note supersedes the resident's note.     REVIEW OF SYSTEMS:  Negative except in HPI.     PMH: None.     FHx: Reviewed. No fhx of asthma/copd, No fhx of liver and pulmonary disease. No fhx of hematological disorder.     Physical Exam:  GEN: No acute distress. Awake, Alert and oriented x 3.   Head: Atraumatic, Normocephalic.   Eye: PEERLA. No sclera icterus. EOMI.   ENT: Normal oropharynx, no thyromegaly, no mass, no lymphadenopathy.   LUNGS: Clear to auscultation bilaterally. No wheeze/rales/crackles.   HEART: Normal. S1/S2 present. RRR. No murmur/gallops.   ABD: Soft, non-tender, non-distended. Bowel sounds present.   EXT: No pitting edema. Left thigh erythema and swelling.   Integumentary: No rash, No sore, No petechia.   NEURO: CN III-XII intact. Strength: 5/5 b/l ULE. Sensory intact b/l ULE.     Vital Signs Last 24 Hrs  T(C): 36.8 (2023 13:24), Max: 36.8 (2023 13:24)  T(F): 98.3 (2023 13:24), Max: 98.3 (2023 13:24)  HR: 105 (2023 13:24) (105 - 105)  BP: 130/79 (2023 13:24) (130/79 - 130/79)  BP(mean): --  RR: 20 (2023 13:24) (20 - 20)  SpO2: 98% (2023 13:24) (98% - 98%)    Parameters below as of 2023 13:24  Patient On (Oxygen Delivery Method): room air      Please see the above notes for Labs and radiology.     Assessment and Plan:     38 yo F with no PMH presents to ED for worsening left thigh redness a/w pain after being bit by spider a week ago.     Left thigh cellulitis   - POCUS done in ED --> evidence of abscess.   - s/p Vancomycin x 1 in ED.   - c/w ancef 2g q8h   - f/u BCx, ESR, CRP, HbA1c   - supportive care.     DVT ppx: Lovenox SC  GI ppx:  not indicated.   Diet: Regular diet   Activity: as tolerated.     Date seen by the attendin2023  Total time spent: 75 minutes. Patient seen and examined at bedside independently of the residents. I read the resident's note and agree with the plan with the additions and corrections as noted below. My note supersedes the resident's note.     REVIEW OF SYSTEMS:  Negative except in HPI.     PMH: None.     FHx: Reviewed. No fhx of asthma/copd, No fhx of liver and pulmonary disease. No fhx of hematological disorder.     Physical Exam:  GEN: No acute distress. Awake, Alert and oriented x 3.   Head: Atraumatic, Normocephalic.   Eye: PEERLA. No sclera icterus. EOMI.   ENT: Normal oropharynx, no thyromegaly, no mass, no lymphadenopathy.   LUNGS: Clear to auscultation bilaterally. No wheeze/rales/crackles.   HEART: Normal. S1/S2 present. RRR. No murmur/gallops.   ABD: Soft, non-tender, non-distended. Bowel sounds present.   EXT: No pitting edema. Left thigh erythema and swelling with induration. Mild tenderness.   Integumentary: No rash, No sore, No petechia.   NEURO: CN III-XII intact. Strength: 5/5 b/l ULE. Sensory intact b/l ULE.     Vital Signs Last 24 Hrs  T(C): 36.8 (2023 13:24), Max: 36.8 (2023 13:24)  T(F): 98.3 (2023 13:24), Max: 98.3 (2023 13:24)  HR: 105 (2023 13:24) (105 - 105)  BP: 130/79 (2023 13:24) (130/79 - 130/79)  BP(mean): --  RR: 20 (2023 13:24) (20 - 20)  SpO2: 98% (2023 13:24) (98% - 98%)    Parameters below as of 2023 13:24  Patient On (Oxygen Delivery Method): room air      Please see the above notes for Labs and radiology.     Assessment and Plan:     36 yo F with no PMH presents to ED for worsening left thigh redness a/w pain after being bit by spider a week ago.     Left thigh cellulitis   - POCUS done in ED --> no evidence of abscess.   - s/p Vancomycin x 1 in ED.   - c/w ancef 2g q8h   - f/u BCx, ESR, CRP, HbA1c   - supportive care.     DVT ppx: Lovenox SC  GI ppx:  not indicated.   Diet: Regular diet   Activity: as tolerated.     Date seen by the attendin2023  Total time spent: 75 minutes.

## 2023-07-11 NOTE — ED PROVIDER NOTE - ATTENDING CONTRIBUTION TO CARE
37-year-old female past with history of a carotid dissection presents with infection.  Patient states that she got a spider bite to her left inner thigh few days ago.  Started noting redness to the area on Saturday and started Bactrim.  Patient marked the area again few days later states that the area of redness is spreading.  Went to see her PMD today who advised her to come to the ED for evaluation.  Patient denies any fevers or chills but reports some generalized fatigue.    CONSTITUTIONAL: Well-developed; well-nourished; in no acute distress.   SKIN: warm, dry. 10cm area of erythema, warmth and induration to the upper left inner thigh. no abscess on ultrasound.   HEAD: Normocephalic; atraumatic.  EYES: PERRL, EOMI, no conjunctival erythema  ENT: No nasal discharge; airway clear.  NECK: Supple; non tender.  CARD: S1, S2 normal;  Regular rate and rhythm.   RESP: No wheezes, rales or rhonchi.  ABD: soft non tender, non distended, no rebound or guarding  EXT: Normal ROM.  5/5 strength in all 4 extremities   LYMPH: No acute cervical adenopathy.  NEURO: Alert, oriented, grossly unremarkable. neurovascularly intact  PSYCH: Cooperative, appropriate.

## 2023-07-11 NOTE — H&P ADULT - NSHPPHYSICALEXAM_GEN_ALL_CORE
PHYSICAL EXAM:  GENERAL: NAD, speaks in full sentences, no signs of respiratory distress  HEAD:  Atraumatic, Normocephalic  EYES: EOMI, PERRLA, anicteric sclera  NECK: Supple, No JVD  CHEST/LUNG: Clear to auscultation bilaterally; No wheeze; No crackles; No accessory muscles used  HEART: Regular rate and rhythm; No murmurs;   ABDOMEN: Soft, Nontender, Nondistended; Bowel sounds present; No guarding  EXTREMITIES:  Left inner thigh erythema   PSYCH: AAOx3  NEUROLOGY: non-focal  SKIN: No rashes or lesions

## 2023-07-11 NOTE — H&P ADULT - NSHPLABSRESULTS_GEN_ALL_CORE
.  LABS:                         15.9   8.85  )-----------( 266      ( 11 Jul 2023 16:00 )             46.8     07-11    137  |  105  |  10  ----------------------------<  81  4.5   |  20  |  0.9    Ca    9.4      11 Jul 2023 16:00    TPro  7.0  /  Alb  4.3  /  TBili  <0.2  /  DBili  x   /  AST  23  /  ALT  29  /  AlkPhos  94  07-11      Urinalysis Basic - ( 11 Jul 2023 16:00 )    Color: x / Appearance: x / SG: x / pH: x  Gluc: 81 mg/dL / Ketone: x  / Bili: x / Urobili: x   Blood: x / Protein: x / Nitrite: x   Leuk Esterase: x / RBC: x / WBC x   Sq Epi: x / Non Sq Epi: x / Bacteria: x            RADIOLOGY, EKG & ADDITIONAL TESTS: Reviewed.

## 2023-07-11 NOTE — ED PROVIDER NOTE - CLINICAL SUMMARY MEDICAL DECISION MAKING FREE TEXT BOX
Patient presents with worsening cellulitis. Has been on bactrim for 3 days with worsening symptoms. no fevers. + fatigue. labs done. Patient admitted for failed outpatient antibiotics.

## 2023-07-11 NOTE — ED PROVIDER NOTE - PHYSICAL EXAMINATION
VITAL SIGNS: I have reviewed nursing notes and confirm.  CONSTITUTIONAL: non-toxic, well appearing  SKIN: +10cm area of erythema, warmth and induration to the upper left inner thigh, no crepitus, no vesicles   EYES: pink conjunctiva, anicteric  ENT: tongue midline, no exudates, MMM  NECK: Supple; no meningismus  CARD: RRR, no murmurs, equal radial pulses bilaterally 2+  RESP: CTAB, no respiratory distress  ABD: Soft, non-tender, non-distended  EXT: Normal ROM x4. No calves tenderness  NEURO: Alert, oriented. no focal deficits

## 2023-07-11 NOTE — ED ADULT TRIAGE NOTE - CHIEF COMPLAINT QUOTE
L upper thigh abscess, pt was placed on po antibiotics x 3 days, states redness is spreading, was sent to ed

## 2023-07-11 NOTE — H&P ADULT - ASSESSMENT
37 year old female with no past medical history presented for worsening cellulitis.    #Cellulitis   -simple, no pus, no abscess on bedside echo  -wbc within normal limits  -failed bactrim, prescribed by urgent care  Plan:  -IV cefazolin 1gram q8  -monitor erythema   -f/u crp and esr    #DL?  -was at some point on statins, not anymore  -f/u am Lipid profile and A1c     MISC  DVT ppx: not needed  GI ppx: none  Diet: Regular  Activity: IAT

## 2023-07-11 NOTE — ED PROVIDER NOTE - OBJECTIVE STATEMENT
37-year-old female past with PMHx of carotid dissection presents with L thigh infection. Patient states that she got a spider bite to her left inner thigh about 1 week ago. Pt began noticing worsening redness to the area about 4 days ago and was started on outpt PO Bactrim. Patient marked the area to monitor and states the redness has began spreading with worsening pain. Pt was referred to ED by PMD today. + generalized weakness. Denies fevers, chills, numbness, weakness, tingling.

## 2023-07-11 NOTE — H&P ADULT - NSHPREVIEWOFSYSTEMS_GEN_ALL_CORE
REVIEW OF SYSTEMS:    CONSTITUTIONAL: No weakness, fevers or chills  EYES/ENT: No visual changes;  No vertigo or throat pain   NECK: No pain or stiffness  RESPIRATORY: No cough, wheezing, hemoptysis; No shortness of breath  CARDIOVASCULAR: No chest pain or palpitations  GASTROINTESTINAL: No abdominal or epigastric pain. No nausea, vomiting, or hematemesis; No diarrhea or constipation. No melena or hematochezia.  GENITOURINARY: No dysuria, frequency or hematuria  NEUROLOGICAL: No numbness or weakness  SKIN: Left inner redness and right thigh induration

## 2023-07-12 ENCOUNTER — TRANSCRIPTION ENCOUNTER (OUTPATIENT)
Age: 38
End: 2023-07-12

## 2023-07-12 VITALS
RESPIRATION RATE: 18 BRPM | DIASTOLIC BLOOD PRESSURE: 57 MMHG | SYSTOLIC BLOOD PRESSURE: 121 MMHG | HEART RATE: 80 BPM | TEMPERATURE: 98 F | OXYGEN SATURATION: 98 %

## 2023-07-12 LAB
A1C WITH ESTIMATED AVERAGE GLUCOSE RESULT: 5.2 % — SIGNIFICANT CHANGE UP (ref 4–5.6)
ANION GAP SERPL CALC-SCNC: 10 MMOL/L — SIGNIFICANT CHANGE UP (ref 7–14)
BASOPHILS # BLD AUTO: 0.06 K/UL — SIGNIFICANT CHANGE UP (ref 0–0.2)
BASOPHILS NFR BLD AUTO: 0.8 % — SIGNIFICANT CHANGE UP (ref 0–1)
BUN SERPL-MCNC: 11 MG/DL — SIGNIFICANT CHANGE UP (ref 10–20)
CALCIUM SERPL-MCNC: 8.9 MG/DL — SIGNIFICANT CHANGE UP (ref 8.4–10.5)
CHLORIDE SERPL-SCNC: 103 MMOL/L — SIGNIFICANT CHANGE UP (ref 98–110)
CHOLEST SERPL-MCNC: 220 MG/DL — HIGH
CO2 SERPL-SCNC: 23 MMOL/L — SIGNIFICANT CHANGE UP (ref 17–32)
CREAT SERPL-MCNC: 0.9 MG/DL — SIGNIFICANT CHANGE UP (ref 0.7–1.5)
CRP SERPL-MCNC: 16.9 MG/L — HIGH
EGFR: 83 ML/MIN/1.73M2 — SIGNIFICANT CHANGE UP
EOSINOPHIL # BLD AUTO: 0.18 K/UL — SIGNIFICANT CHANGE UP (ref 0–0.7)
EOSINOPHIL NFR BLD AUTO: 2.4 % — SIGNIFICANT CHANGE UP (ref 0–8)
ERYTHROCYTE [SEDIMENTATION RATE] IN BLOOD: 20 MM/HR — SIGNIFICANT CHANGE UP (ref 0–20)
ESTIMATED AVERAGE GLUCOSE: 103 MG/DL — SIGNIFICANT CHANGE UP (ref 68–114)
GLUCOSE SERPL-MCNC: 97 MG/DL — SIGNIFICANT CHANGE UP (ref 70–99)
HCT VFR BLD CALC: 44.2 % — SIGNIFICANT CHANGE UP (ref 37–47)
HDLC SERPL-MCNC: 29 MG/DL — LOW
HGB BLD-MCNC: 15.2 G/DL — SIGNIFICANT CHANGE UP (ref 12–16)
IMM GRANULOCYTES NFR BLD AUTO: 0.3 % — SIGNIFICANT CHANGE UP (ref 0.1–0.3)
LIPID PNL WITH DIRECT LDL SERPL: 140 MG/DL — HIGH
LYMPHOCYTES # BLD AUTO: 1.86 K/UL — SIGNIFICANT CHANGE UP (ref 1.2–3.4)
LYMPHOCYTES # BLD AUTO: 24.7 % — SIGNIFICANT CHANGE UP (ref 20.5–51.1)
MAGNESIUM SERPL-MCNC: 2.1 MG/DL — SIGNIFICANT CHANGE UP (ref 1.8–2.4)
MCHC RBC-ENTMCNC: 29.5 PG — SIGNIFICANT CHANGE UP (ref 27–31)
MCHC RBC-ENTMCNC: 34.4 G/DL — SIGNIFICANT CHANGE UP (ref 32–37)
MCV RBC AUTO: 85.8 FL — SIGNIFICANT CHANGE UP (ref 81–99)
MONOCYTES # BLD AUTO: 0.64 K/UL — HIGH (ref 0.1–0.6)
MONOCYTES NFR BLD AUTO: 8.5 % — SIGNIFICANT CHANGE UP (ref 1.7–9.3)
NEUTROPHILS # BLD AUTO: 4.77 K/UL — SIGNIFICANT CHANGE UP (ref 1.4–6.5)
NEUTROPHILS NFR BLD AUTO: 63.3 % — SIGNIFICANT CHANGE UP (ref 42.2–75.2)
NON HDL CHOLESTEROL: 191 MG/DL — HIGH
NRBC # BLD: 0 /100 WBCS — SIGNIFICANT CHANGE UP (ref 0–0)
PLATELET # BLD AUTO: 235 K/UL — SIGNIFICANT CHANGE UP (ref 130–400)
PMV BLD: 10.9 FL — HIGH (ref 7.4–10.4)
POTASSIUM SERPL-MCNC: 4.5 MMOL/L — SIGNIFICANT CHANGE UP (ref 3.5–5)
POTASSIUM SERPL-SCNC: 4.5 MMOL/L — SIGNIFICANT CHANGE UP (ref 3.5–5)
RBC # BLD: 5.15 M/UL — SIGNIFICANT CHANGE UP (ref 4.2–5.4)
RBC # FLD: 13.7 % — SIGNIFICANT CHANGE UP (ref 11.5–14.5)
SODIUM SERPL-SCNC: 136 MMOL/L — SIGNIFICANT CHANGE UP (ref 135–146)
TRIGL SERPL-MCNC: 255 MG/DL — HIGH
WBC # BLD: 7.53 K/UL — SIGNIFICANT CHANGE UP (ref 4.8–10.8)
WBC # FLD AUTO: 7.53 K/UL — SIGNIFICANT CHANGE UP (ref 4.8–10.8)

## 2023-07-12 PROCEDURE — 99232 SBSQ HOSP IP/OBS MODERATE 35: CPT

## 2023-07-12 RX ORDER — ACETAMINOPHEN 500 MG
650 TABLET ORAL EVERY 6 HOURS
Refills: 0 | Status: DISCONTINUED | OUTPATIENT
Start: 2023-07-12 | End: 2023-07-12

## 2023-07-12 RX ORDER — CEFAZOLIN SODIUM 1 G
2000 VIAL (EA) INJECTION EVERY 8 HOURS
Refills: 0 | Status: DISCONTINUED | OUTPATIENT
Start: 2023-07-12 | End: 2023-07-12

## 2023-07-12 RX ADMIN — Medication 100 MILLIGRAM(S): at 13:49

## 2023-07-12 RX ADMIN — Medication 100 MILLIGRAM(S): at 17:02

## 2023-07-12 RX ADMIN — Medication 650 MILLIGRAM(S): at 12:36

## 2023-07-12 RX ADMIN — Medication 650 MILLIGRAM(S): at 13:06

## 2023-07-12 RX ADMIN — Medication 100 MILLIGRAM(S): at 12:07

## 2023-07-12 RX ADMIN — Medication 100 MILLIGRAM(S): at 05:15

## 2023-07-12 NOTE — DISCHARGE NOTE PROVIDER - CARE PROVIDER_API CALL
Elia Morin  Internal Medicine  [address] [place] [id number]  Phone: [phone]  Fax: [phone]  Established Patient  Follow Up Time: 1 week

## 2023-07-12 NOTE — PROGRESS NOTE ADULT - SUBJECTIVE AND OBJECTIVE BOX
BANDAR WOODS  37y  [facility]-N ED Hold 016 A      Patient is a 37y old  Female who presents with a chief complaint of Cellulitis (11 Jul 2023 20:28)      INTERVAL HPI/OVERNIGHT EVENTS:        REVIEW OF SYSTEMS:        FAMILY HISTORY:  No pertinent family history in first degree relatives      T(C): 35.9 (07-12-23 @ 07:21), Max: 36.8 (07-11-23 @ 13:24)  HR: 86 (07-12-23 @ 07:21) (85 - 105)  BP: 113/58 (07-12-23 @ 07:21) (113/58 - 130/79)  RR: 16 (07-12-23 @ 07:21) (16 - 20)  SpO2: 99% (07-12-23 @ 07:21) (98% - 99%)  Wt(kg): --Vital Signs Last 24 Hrs  T(C): 35.9 (12 Jul 2023 07:21), Max: 36.8 (11 Jul 2023 13:24)  T(F): 96.7 (12 Jul 2023 07:21), Max: 98.3 (11 Jul 2023 13:24)  HR: 86 (12 Jul 2023 07:21) (85 - 105)  BP: 113/58 (12 Jul 2023 07:21) (113/58 - 130/79)  BP(mean): --  RR: 16 (12 Jul 2023 07:21) (16 - 20)  SpO2: 99% (12 Jul 2023 07:21) (98% - 99%)    Parameters below as of 12 Jul 2023 07:21  Patient On (Oxygen Delivery Method): room air        PHYSICAL EXAM:  GENERAL: NAD, well-groomed, well-developed  HEAD:  Atraumatic, Normocephalic  EYES: EOMI, PERRLA, conjunctiva and sclera clear  ENMT: No tonsillar erythema, exudates, or enlargement; Moist mucous membranes, Good dentition, No lesions  NECK: Supple, No JVD, Normal thyroid  NERVOUS SYSTEM:  Alert & Oriented X3, Good concentration; Motor Strength 5/5 B/L upper and lower extremities; DTRs 2+ intact and symmetric  PULM: Clear to auscultation bilaterally  CARDIAC: Regular rate and rhythm; No murmurs, rubs, or gallops  GI: Soft, Nontender, Nondistended; Bowel sounds present  EXTREMITIES:  2+ Peripheral Pulses, No clubbing, cyanosis, or edema  LYMPH: No lymphadenopathy noted  SKIN: No rashes or lesions    Consultant(s) Notes Reviewed:  [x ] YES  [ ] NO  Care Discussed with Consultants/Other Providers [ x] YES  [ ] NO    LABS:                            15.2   7.53  )-----------( 235      ( 12 Jul 2023 05:41 )             44.2   07-12    136  |  103  |  11  ----------------------------<  97  4.5   |  23  |  0.9    Ca    8.9      12 Jul 2023 05:41  Mg     2.1     07-12    TPro  7.0  /  Alb  4.3  /  TBili  <0.2  /  DBili  x   /  AST  23  /  ALT  29  /  AlkPhos  94  07-11            ceFAZolin   IVPB 2000 milliGRAM(s) IV Intermittent every 8 hours    This is a 38 yo F with no PMH presents to ED for worsening left thigh redness a/w pain after being bit by spider a week ago.     1. Left thigh cellulitis   *  POCUS done in ED --> no evidence of abscess.   - ESR:pending                     - CRP:pending                      - Hgba1c:pending  - c/w ancef 2g q8h   - Blood cx:pending         - supportive care.     DVT ppx: Lovenox SC  GI ppx:  not indicated.   Diet: Regular diet   Activity: as tolerated.        CHUCK BANDAR  37y  [facility]-N ED Hold 016 A      Patient is a 37y old  Female who presents with a chief complaint of Cellulitis (11 Jul 2023 20:28)      INTERVAL HPI/OVERNIGHT EVENTS:  patient feels well. She has no complains. no overnight events  her erythema is spreading which is concerning for her         FAMILY HISTORY:  No pertinent family history in first degree relatives      T(C): 35.9 (07-12-23 @ 07:21), Max: 36.8 (07-11-23 @ 13:24)  HR: 86 (07-12-23 @ 07:21) (85 - 105)  BP: 113/58 (07-12-23 @ 07:21) (113/58 - 130/79)  RR: 16 (07-12-23 @ 07:21) (16 - 20)  SpO2: 99% (07-12-23 @ 07:21) (98% - 99%)  Wt(kg): --Vital Signs Last 24 Hrs  T(C): 35.9 (12 Jul 2023 07:21), Max: 36.8 (11 Jul 2023 13:24)  T(F): 96.7 (12 Jul 2023 07:21), Max: 98.3 (11 Jul 2023 13:24)  HR: 86 (12 Jul 2023 07:21) (85 - 105)  BP: 113/58 (12 Jul 2023 07:21) (113/58 - 130/79)  BP(mean): --  RR: 16 (12 Jul 2023 07:21) (16 - 20)  SpO2: 99% (12 Jul 2023 07:21) (98% - 99%)    Parameters below as of 12 Jul 2023 07:21  Patient On (Oxygen Delivery Method): room air        PHYSICAL EXAM:  GENERAL: NAD, well-groomed, well-developed  NERVOUS SYSTEM:  Alert & Oriented X3, Good concentration; Motor Strength 5/5 B/L upper and lower extremities; DTRs 2+ intact and symmetric  PULM: Clear to auscultation bilaterally  CARDIAC: Regular rate and rhythm; No murmurs, rubs, or gallops  GI: Soft, Nontender, Nondistended; Bowel sounds present  EXTREMITIES left thigh erythema slightly above the knee, tender area and hard to palpation     Consultant(s) Notes Reviewed:  [x ] YES  [ ] NO  Care Discussed with Consultants/Other Providers [ x] YES  [ ] NO    LABS:                            15.2   7.53  )-----------( 235      ( 12 Jul 2023 05:41 )             44.2   07-12    136  |  103  |  11  ----------------------------<  97  4.5   |  23  |  0.9    Ca    8.9      12 Jul 2023 05:41  Mg     2.1     07-12    TPro  7.0  /  Alb  4.3  /  TBili  <0.2  /  DBili  x   /  AST  23  /  ALT  29  /  AlkPhos  94  07-11            ceFAZolin   IVPB 2000 milliGRAM(s) IV Intermittent every 8 hours    This is a 38 yo F with no PMH presents to ED for worsening left thigh redness a/w pain after being bit by spider a week ago.     1. Left thigh cellulitis persistent  * failed outpatient bactrim. To note, bactrim won't cover strep   *  POCUS done in ED --> no evidence of abscess.   - ESR: nl for her age                    - CRP:Slightly elevated                    - Hgba1c:pending  - c/w ancef 2g q8h + Doxycyline d:1   - Warm compress for 15 min every 2 hours. Seems that it will form an abscess  - Wing the cellulitis area   - Elevate affected leg   - Blood cx:pending         - supportive care.     DVT ppx: Lovenox SC  GI ppx:  not indicated.   Diet: Regular diet   Activity: as tolerated.     Possible dc in am

## 2023-07-12 NOTE — DISCHARGE NOTE PROVIDER - HOSPITAL COURSE
37 year old female patient with no past medical history presented for thigh pain and redness. Symptoms started few days ago and patient went to urgent care where she was diagnosed with cellulitis and was started on Bactrim but the erythema was worsening. The patient marked the edges of the erythema and the it was extending beyond the markings so she came to the ED. Patient reported being exposed to spiders bites a week ago.    In the ED vitals and labs were within normal limits    Bedside US showed no abscess. Patient was admitted to medicine floor for further evaluation.     The patient wishes to leave against medical advice.  I have discussed the risks, benefits and alternatives (including the possibility of worsening of disease, pain, permanent disability, and/or death) with the patient and his/her family (if available).  The patient voices understanding of these risks, benefits, and alternatives and still wishes to sign out against medical advice.  The patient is awake, alert, oriented  x 3 and has demonstrated capacity to refuse/direct care.  I have advised the patient that they can and should return immediately should they develop any worse/different/additional symptoms, or if they change their mind and want to continue their care.     1. Left thigh cellulitis persistent  * failed outpatient bactrim. To note, bactrim won't cover strep   *  POCUS done in ED --> no evidence of abscess.   - ESR: nl for her age                    - CRP:Slightly elevated                    - Hgba1c:pending  - c/w ancef 2g q8h + Doxycyline d:1   - Warm compress for 15 min every 2 hours. Seems that it will form an abscess  - Wing the cellulitis area   - Elevate affected leg   - Blood cx:pending         - supportive care.

## 2023-07-16 LAB
CULTURE RESULTS: SIGNIFICANT CHANGE UP
CULTURE RESULTS: SIGNIFICANT CHANGE UP
SPECIMEN SOURCE: SIGNIFICANT CHANGE UP
SPECIMEN SOURCE: SIGNIFICANT CHANGE UP

## 2023-07-18 DIAGNOSIS — Z79.82 LONG TERM (CURRENT) USE OF ASPIRIN: ICD-10-CM

## 2023-07-18 DIAGNOSIS — L03.116 CELLULITIS OF LEFT LOWER LIMB: ICD-10-CM
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
## 2023-07-18 DIAGNOSIS — E78.5 HYPERLIPIDEMIA, UNSPECIFIED: ICD-10-CM
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
## 2023-07-18 DIAGNOSIS — S70.362A INSECT BITE (NONVENOMOUS), LEFT THIGH, INITIAL ENCOUNTER: ICD-10-CM

## 2023-07-27 NOTE — ED ADULT NURSE NOTE - NS ED NURSE LEVEL OF CONSCIOUSNESS AFFECT
"Anesthesia Transfer of Care Note    Patient: Ludivina Zavaleta    Procedure(s) Performed: Procedure(s) (LRB):  COLONOSCOPY (N/A)    Patient location: PACU    Anesthesia Type: general    Transport from OR: Transported from OR on room air with adequate spontaneous ventilation    Post pain: adequate analgesia    Post assessment: no apparent anesthetic complications    Post vital signs: stable    Level of consciousness: awake    Nausea/Vomiting: no nausea/vomiting    Complications: none    Transfer of care protocol was followed      Last vitals:   Visit Vitals  /82   Pulse 72   Temp 36.6 °C (97.9 °F) (Temporal)   Resp 16   Ht 5' 4" (1.626 m)   Wt 67.6 kg (149 lb)   LMP 10/28/2013   SpO2 100%   Breastfeeding No   BMI 25.58 kg/m²     " Appropriate/Anxious

## 2023-10-26 NOTE — ED PROVIDER NOTE - PROGRESS NOTE DETAILS
Rapid Strep negative, will send throat culture and follow-up if positive. Continue over-the-counter products for symptoms: tylenol for fevers, ibuprofen for body aches, flonase (fluticasone) with nasal saline and sudafed for nasal congestion, mucinex for cough, and airborne/emergen-c for vitamin supplementation. May return to school if fever-free for 24 hours without the use of medications and 5 days after symptom onset but recommend strict masking for 5 additional days once return to school. Follow-up with PCP in 3-5 days if no improvement of symptoms. Report to ER if symptoms worsen or develop difficulty breathing. Authored by Dr. Gonzalez: bedside POCUS with no evidence of abscess, cobblestoning c/w cellulitis, failed outpt abx hence will require admission for IV abx

## 2024-07-20 ENCOUNTER — NON-APPOINTMENT (OUTPATIENT)
Age: 39
End: 2024-07-20